# Patient Record
Sex: FEMALE | Race: BLACK OR AFRICAN AMERICAN | NOT HISPANIC OR LATINO | Employment: FULL TIME | URBAN - METROPOLITAN AREA
[De-identification: names, ages, dates, MRNs, and addresses within clinical notes are randomized per-mention and may not be internally consistent; named-entity substitution may affect disease eponyms.]

---

## 2017-01-25 ENCOUNTER — OFFICE VISIT (OUTPATIENT)
Dept: FAMILY MEDICINE | Facility: CLINIC | Age: 27
End: 2017-01-25

## 2017-01-25 VITALS
SYSTOLIC BLOOD PRESSURE: 141 MMHG | BODY MASS INDEX: 47.09 KG/M2 | HEIGHT: 66 IN | TEMPERATURE: 98.4 F | HEART RATE: 84 BPM | WEIGHT: 293 LBS | DIASTOLIC BLOOD PRESSURE: 86 MMHG | OXYGEN SATURATION: 99 %

## 2017-01-25 DIAGNOSIS — F51.01 PRIMARY INSOMNIA: Primary | ICD-10-CM

## 2017-01-25 DIAGNOSIS — F41.9 ANXIETY: ICD-10-CM

## 2017-01-25 RX ORDER — HYDROXYZINE HYDROCHLORIDE 25 MG/1
25 TABLET, FILM COATED ORAL EVERY 6 HOURS PRN
Qty: 90 TABLET | Refills: 3 | Status: SHIPPED | OUTPATIENT
Start: 2017-01-25

## 2017-01-25 NOTE — PROGRESS NOTES
HPI:   June Yepez is a 26 year old  female who presents to clinic today for  Medication refill of her Atarax for which she takes it for anxiety, itching, and sleep as needed.  She states she ran out roughly 1 month ago.  During that time she has realizes the benefit that it gave her and is now requesting more.  She was previously seen roughly one year ago at our clinic by Dr. Hernandez is no longer at her clinic.  She has previously seen a therapist at [facility] and stated it was a good experience, however there no longer there.  Would be willing to see a new therapist at our clinic.  PTH Q9 today was 6 and SOCORRO 7 Was 13.  Denies any previous side effects with the medication.  Denies any suicidal ideation.  Medication list reviewed and updated.  Patient still has Nexplanon in place.  No other concerns today.    Patient is an established patient of this clinic.         PMHX:   Active Problems List  Patient Active Problem List   Diagnosis     Abnormal Pap smear of cervix     Allergic rhinitis     Posttraumatic stress disorder     Major depressive disorder, single episode, moderate (H)     Obesity     Unprotected sexual intercourse     Active problem list reviewed and updated.    Current Medications  Current Outpatient Prescriptions   Medication Sig Dispense Refill     hydrOXYzine (ATARAX) 25 MG tablet Take 1 tablet (25 mg) by mouth every 6 hours as needed for anxiety (Can take 50 mg before bedtime to help you sleep.) 90 tablet 3     etonogestrel (NEXPLANON) 68 MG IMPL 1 each by Subdermal route once Placed 5/2015       acetaminophen (TYLENOL) 500 MG tablet Take 500-1,000 mg by mouth every 6 hours as needed       Medication list reviewed and updated.    Social History  - Active Smoker: No  - Alcohol Use: No  - Illicit Drug use: No    Allergies  Allergies   Allergen Reactions     Penicillins      Face swelling     Egg Yolk      Allergic Eggs, throat will swollen up     Olive Oil      Throat swollen up  "    Allergies and Medication Intolerances Updated         Physical Exam:     Filed Vitals:    01/25/17 1610   BP: 141/86   Pulse: 84   Temp: 98.4  F (36.9  C)   TempSrc: Oral   Height: 5' 6\" (167.6 cm)   Weight: 321 lb 3.2 oz (145.695 kg)   SpO2: 99%     Body mass index is 51.87 kg/(m^2).    General: Obese female. Appears well and in no acute distress.  HEENT: Eyes grossly normal to inspection. Extraocular movements intact. Pupils equal, round, and reactive to light. Mucous membranes moist. No ulcers or lesions noted in the oropharynx.  Cardiovascular: Regular rate and rhythm, normal S1 and S2 without murmur. No extra heartsounds or friction rub. Radial pulses present and equal bilaterally.  Respiratory: Lungs clear to auscultation bilaterally. No wheezing or crackles. No prolonged expiration. Symmetrical chest rise.    Assessment and Plan   Anxiety: No suicidal ideation. Would like to see a therapist again. Referral placed. Atarax prescribed.  - hydrOXYzine (ATARAX) 25 MG tablet; Take 1 tablet (25 mg) by mouth every 6 hours as needed for anxiety (Can take 50 mg before bedtime to help you sleep.)  Dispense: 90 tablet; Refill: 3  - Mental Health Referral - [facility]    Options for treatment and follow-up care were reviewed with the patient and/or guardian. June Yepez and/or guardian engaged in the decision making process and verbalized understanding of the options discussed and agreed with the final plan.    Shai Severino MD  [facility] Resident  Pager# [phone]    Precepted with: Esau Hope MD            "

## 2017-01-25 NOTE — PATIENT INSTRUCTIONS
- I have sent a prescription of Atarax to your pharmacy. Take it every 6 hours as needed (25 mg). You can take 50 mg at night for sleep    - I have put in a referral for a therapist. They will call you to set this up.    As always, please call with any questions or concerns. I look forward to seeing you again soon!    Take care,  Dr. Severino

## 2017-01-25 NOTE — MR AVS SNAPSHOT
After Visit Summary   1/25/2017    June Yepez    MRN: [id number]           Patient Information     Date Of Birth          1990        Visit Information        Provider Department      1/25/2017 4:00 PM Shai Severino MD [facility]        Today's Diagnoses     Insomnia    -  1     Anxiety           Care Instructions    - I have sent a prescription of Atarax to your pharmacy. Take it every 6 hours as needed (25 mg). You can take 50 mg at night for sleep    - I have put in a referral for a therapist. They will call you to set this up.    As always, please call with any questions or concerns. I look forward to seeing you again soon!    Take care,  Dr. Severino          Follow-ups after your visit        Additional Services     Mental Health Referral - [facility]       Use this form for behavioral health consults and assessments. The referral coordinator will help to determine whether patients are best served by clinic behavioral health staff or by community providers.    Type of referral(s) requested (indicate all that apply):  Adult Psychotherapy--for diagnosis and non-pharmacological treatment    Reason for referral: Anxiety    Currently receiving mental health services (if 'Yes', what services and why today's referral?): No, was previously seen at [place]   Currently having suicidal thoughts: No  Previous psych hospitalization: Yes, ~ 7 years ago    Please provide data for below screening tools if available.   PHQ-9 Score: 6  GAD7 Score: 13     needed: No  Language: English                  Who to contact     Please call your clinic at [phone] to:    Ask questions about your health    Make or cancel appointments    Discuss your medicines    Learn about your test results    Speak to your doctor   If you have compliments or concerns about an experience at your clinic, or if you wish to file a complaint, please contact [facility]  "Physicians Patient Relations at [phone] or email us at [email or web address]         Additional Information About Your Visit        [employer] Information     [employer] gives you secure access to your electronic health record. If you see a primary care provider, you can also send messages to your care team and make appointments. If you have questions, please call your primary care clinic.  If you do not have a primary care provider, please call [phone] and they will assist you.      [employer] [employer] is an electronic gateway that provides easy, online access to your medical records. With [employer], you can request a clinic appointment, read your test results, renew a prescription or communicate with your care team.     To access your existing account, please contact your [facility] or call [phone] for assistance.        Care EveryWhere ID     This is your Care EveryWhere ID. This could be used by other organizations to access your [facility] medical records  [id number]        Your Vitals Were     Pulse Temperature Height BMI (Body Mass Index) Pulse Oximetry       84 98.4  F (36.9  C) (Oral) 5' 6\" (167.6 cm) 51.87 kg/m2 99%        Blood Pressure from Last 3 Encounters:   01/25/17 141/86   08/27/15 120/73   06/23/15 115/82    Weight from Last 3 Encounters:   01/25/17 321 lb 3.2 oz (145.695 kg)   08/27/15 298 lb 6.4 oz (135.353 kg)   06/23/15 286 lb 3.2 oz (129.819 kg)              We Performed the Following     Mental Health Referral - [facility]          Where to get your medicines      These medications were sent to [id number] - [place], [place] - [address], [place] [address]     Phone:  [phone]    - hydrOXYzine 25 MG tablet       Primary Care Provider Office Phone # Fax #    Shai Severino [phone]       [facility] [address]   " [place] [address]        Thank you!     Thank you for choosing [facility]  for your care. Our goal is always to provide you with excellent care. Hearing back from our patients is one way we can continue to improve our services. Please take a few minutes to complete the written survey that you may receive in the mail after your visit with us. Thank you!             Your Updated Medication List - Protect others around you: Learn how to safely use, store and throw away your medicines at www.disposemymeds.org.          This list is accurate as of: 1/25/17  4:28 PM.  Always use your most recent med list.                   Brand Name Dispense Instructions for use    acetaminophen 500 MG tablet    TYLENOL     Take 500-1,000 mg by mouth every 6 hours as needed       hydrOXYzine 25 MG tablet    ATARAX    90 tablet    Take 1 tablet (25 mg) by mouth every 6 hours as needed for anxiety (Can take 50 mg before bedtime to help you sleep.)       NEXPLANON 68 MG Impl   Generic drug:  etonogestrel      1 each by Subdermal route once Placed 5/2015

## 2017-01-25 NOTE — PROGRESS NOTES
Preceptor Attestation:  Patient's case reviewed and discussed with Shai Severino MD Patient seen and discussed with the resident.. I agree with assessment and plan of care.  Supervising Physician:  Esau Hpoe MD  [facility]

## 2017-01-30 ENCOUNTER — DOCUMENTATION ONLY (OUTPATIENT)
Dept: FAMILY MEDICINE | Facility: CLINIC | Age: 27
End: 2017-01-30

## 2017-01-30 NOTE — PROGRESS NOTES
Procedure Requested        9035     Mental Health Referral - [facility] [#[id number]]         Priority: Routine  Class: External referral         Comment:Use this form for behavioral health consults and assessments. The                  referral coordinator will help to determine whether patients are                  best served by clinic behavioral health staff or by community                  providers.                                    Type of referral(s) requested (indicate all that apply):                  Adult Psychotherapy--for diagnosis and non-pharmacological                   treatment                                    Reason for referral: Anxiety                                    Currently receiving mental health services (if 'Yes', what                   services and why today's referral?): No, was previously seen at                   [place]                   Currently having suicidal thoughts: No                  Previous psych hospitalization: Yes, ~ 7 years ago                                    Please provide data for below screening tools if available.                   PHQ-9 Score: 6                  GAD7 Score: 13                                     needed: No                  Language: English       Associated Diagnoses         F41.9 Anxiety               YUDELKA GARCIA FLOW          [id number]               : 1990  F      [address]                                PCP: MARICRUZ, DO*     [place] [address]                                CTR: [facility]

## 2017-01-30 NOTE — PROGRESS NOTES
What: Psychology   Where: [facility]    When:    Who is with:    Telephone: [phone]  Fax: [phone]  Any additional comments: I called the pt to help set up this appointment, pt stated that she did not know her job schedule. I told her that she is already been approved to see , she can call and set up this appointment when she knows her schedule.  Pt stated that she will call and make the appointment when she knows her schedule.   Scheduled by: NICK Hurt

## 2017-02-22 ENCOUNTER — OFFICE VISIT (OUTPATIENT)
Dept: FAMILY MEDICINE | Facility: CLINIC | Age: 27
End: 2017-02-22

## 2017-02-22 VITALS
OXYGEN SATURATION: 100 % | RESPIRATION RATE: 16 BRPM | SYSTOLIC BLOOD PRESSURE: 132 MMHG | HEART RATE: 73 BPM | TEMPERATURE: 98.7 F | HEIGHT: 67 IN | DIASTOLIC BLOOD PRESSURE: 84 MMHG | BODY MASS INDEX: 45.99 KG/M2 | WEIGHT: 293 LBS

## 2017-02-22 DIAGNOSIS — A59.9 TRICHOMONAS INFECTION: Primary | ICD-10-CM

## 2017-02-22 DIAGNOSIS — R35.0 URINARY FREQUENCY: ICD-10-CM

## 2017-02-22 LAB
BACTERIA: NORMAL
BACTERIA: NORMAL
BILIRUBIN UR: NEGATIVE
BLOOD UR: ABNORMAL
CASTS: NORMAL /LPF
CLARITY, URINE: CLEAR
CLUE CELLS: NORMAL
COLOR UR: YELLOW
CRYSTAL URINE: NORMAL /LPF
EPITHELIAL CELLS UR: NORMAL /LPF (ref 0–2)
GLUCOSE URINE: NEGATIVE
HCG UR QL: NEGATIVE
KETONES UR QL: NEGATIVE
LEUKOCYTE ESTERASE UR: ABNORMAL
MOTILE TRICHOMONAS: POSITIVE
MUCOUS URINE: NORMAL LPF
NITRITE UR QL STRIP: NEGATIVE
ODOR: NORMAL
PH UR STRIP: 5.5 [PH] (ref 5–7)
PH WET PREP: NORMAL
PROTEIN UR: NEGATIVE
RBC URINE: NORMAL /HPF
SP GR UR STRIP: 1.02
UROBILINOGEN UR STRIP-ACNC: ABNORMAL
WBC URINE: NORMAL /HPF
WBC WET PREP: NORMAL
YEAST: NORMAL

## 2017-02-22 RX ORDER — METRONIDAZOLE 500 MG/1
2000 TABLET ORAL ONCE
Qty: 8 TABLET | Refills: 0 | Status: SHIPPED | OUTPATIENT
Start: 2017-02-22 | End: 2017-02-22

## 2017-02-22 NOTE — PROGRESS NOTES
"[facility] Progress note: February 22, 2017       HPI:       June Yepez is a 27 year old female who presents for new concern of:     Vaginal discharge/urinary frequency:   - on going the past 2 weeks  - having whitish vaginal discharge with foul odor, also having itching   - also describes vaginal discomfort, \"feels like something is stuck\", does not think there is any tampon or foreign body present  - also mentions experiencing urinary frequency/urgency and breast \"tingling\" intermittently over the past 2 weeks and wants to make sure she is not pregnant, is using nexplanon for birth control, LMP 1/30, has had unprotected sex recently with the same partner she has had over the past year  - no hx of frequent UTI's or vaginal infections   - no fever, chills, nausea, or vomiting           PMHX:     Patient Active Problem List   Diagnosis     Abnormal Pap smear of cervix     Allergic rhinitis     Posttraumatic stress disorder     Major depressive disorder, single episode, moderate (H)     Unprotected sexual intercourse     Morbid obesity (H)     Family history of malignant neoplasm of breast in relative diagnosed when younger than 45 years of age       Current Outpatient Prescriptions   Medication Sig Dispense Refill     hydrOXYzine (ATARAX) 25 MG tablet Take 1 tablet (25 mg) by mouth every 6 hours as needed for anxiety (Can take 50 mg before bedtime to help you sleep.) 90 tablet 3     etonogestrel (NEXPLANON) 68 MG IMPL 1 each by Subdermal route once Placed 5/2015       acetaminophen (TYLENOL) 500 MG tablet Take 500-1,000 mg by mouth every 6 hours as needed            Allergies   Allergen Reactions     Penicillins      Face swelling     Egg Yolk      Allergic Eggs, throat will swollen up     Olive Oil      Throat swollen up            Review of Systems:   Complete ROS negative other than above          Physical Exam:     Vitals:    02/22/17 1552   BP: 132/84   Pulse: 73   Resp: 16   Temp: 98.7  F (37.1 " " C)   TempSrc: Oral   SpO2: 100%   Weight: (!) 326 lb (147.9 kg)   Height: 5' 7\" (170.2 cm)     Body mass index is 51.06 kg/(m^2).    Gen: AOx3, NAD  HEENT: PERRL, EOMI, no icterus, MMM  Lungs: CTAB, no wheezing or crackles  CV: RRR, no murmurs/rubs/gallops  ABD: Soft, obese, nontender, ND, no masses, BS+  : normal appearing vaginal mucosa, cervix not visualized, whitish-green thick malodorous discharge present  Extrem: warm with pulses, no edema  Skin: no rash or lesions visible  Neuro: grossly intact, no focal deficits    Results for orders placed or performed in visit on 02/22/17   Urinalysis, Micro If ([facility])   Result Value Ref Range    Specific Gravity Urine 1.020 1.005 - 1.030    pH Urine 5.5 4.5 - 8.0    Leukocyte Esterase UR 2+ (A) NEGATIVE    Nitrite Urine Negative NEGATIVE    Protein UR Negative NEGATIVE    Glucose Urine Negative NEGATIVE    Ketones Urine Negative NEGATIVE    Urobilinogen mg/dL 0.2 E.U./dL 0.2 E.U./dL    Bilirubin UR Negative NEGATIVE    Blood UR 1+ (A) NEGATIVE    Color Urine Yellow     Clarity, urine Clear    HCG Qualitative Urine (UPT)  ([facility])   Result Value Ref Range    HCG Qual Urine NEGATIVE Negative    Narrative    SG 1.020   Urine Microscopic ([facility])   Result Value Ref Range    WBC Urine  <5 /hpf    RBC Urine 5-10 <5 /hpf    Epithelial Cells UR 10-25 0 - 2 /lpf    Mucous Urine None NONE lpf    Casts Urine None NONE /lpf    Crystal Urine None NONE /lpf    Bacteria Wet Prep Few None   Wet Prep ([facility])   Result Value Ref Range    Yeast Wet Prep None none    Motile Trichomonas Wet Prep Positive Negative    Clue Cells Wet Prep None NONE    WBC WET PREP 5-10 2 - 5    Bacteria Wet Prep Moderate None    pH Wet Prep Not performed 3.8 - 4.5    Odor Wet Prep None NONE         Assessment and Plan     Vaginal discharge: started about 2 weeks ago, also associated with vaginal discomfort, urinary frequency. On exam there is thick malodorous whitish-green discharge present. Reports " she has been a stable relationship with same partner for about the last year, no new sexual partners.   - UPT negative  - Wet prep is positive for trichomonas infection - discussed that this is a sexually transmitted disease and her partner will also require treatment. Should use condoms for a week after taking antibiotic. Prescribed 2 g metronidazole one time dose for her and her partner who has no antibiotic allergies. Discussed avoidance of alcohol while taking this. Also provided hand-out on trichomonas infection.   - GC/chlamydia pending    Urinary frequency: UA w/micro shows pyuria. This is likely related to trichomonas urethritis. Treatment as above with metronidazole.     Bong Barreto MD PGY3  [facility] Medicine Residency      Precepted today with: Dr. Villar

## 2017-02-22 NOTE — PATIENT INSTRUCTIONS
Take metronidazole 4 pills once. You're partner should do the same. For the next week use condoms during sex.   Trichomonas Vaginal Infection (Trichomoniasis)    You have a Trichomonas vaginal infection. This problem is often called  trich.  It is caused by a parasite that is passed during sex. This makes trich a sexually transmitted disease (STD). Both men and women can get trich, but it is more common in women.  Most people who have trich don t have any symptoms at first. If symptoms do occur, they may take weeks or months to develop.  Symptoms in women can include:    Thin discharge from the vagina that may smell bad and be clear, white, gray, green, or yellow in color    Itching, burning, redness, or soreness in or around the vagina    Pain in the lower belly    Frequent urination or pain and burning during urination    Pain during sex  Symptoms in men are not very common. Symptoms in men are not very common. Men may have trich and pass it to women during sex without knowing they were ever infected.  Trich is most often trated with anbiotics. Without treatment, trich can increase the risk of more serious health problems such as:    Pelvic inflammatory disease (PID)     delivery (giving birth to a baby early if you re pregnant)    HIV and certain other sexually transmitted diseases (STDs)  Home care    Take the antibiotics you re prescribed exactly as directed. Finish all of the medicine, even if your symptoms go away.    Avoid drinking alcohol until you re done with your treatment.    Tell any partners you have sex with that you have trich. They will need be tested for trich and possibly treated as well.    Avoid having sex until you and any partners you have sex with are confirmed to no longer have trich.  Prevention  The only way to avoid getting trich or any other STD is to avoid having sex. If you choose to have sex, then take steps to lower your health risks:    Use condoms when having sex.    Limit  the number of partners you have sex with.    Get tested regularly for STDs. Ask any partner you have sex with to do the same.    Don t have sex with anyone who has symptoms that may be due to an STD.  Follow-up care  Follow up with your healthcare provider, or as advised. Testing will likely be done to ensure that the infection has cleared.  When to seek medical advice  Call your healthcare provider right away if:    You have a fever of 100.4 F (38 C) or higher, or as directed by your provider.    Your symptoms worsen, or they don t go away even after completing your treatment.    You have new pain in the lower belly or pelvic region.    You have side effects that bother you or a reaction to the medicine you re taking.    You or any partners you have sex with have new symptoms, such as a rash, joint pain, or sores.    2237-4940 [employer]. [address] [place] [address], [place] [address]. All rights reserved. This information is not intended as a substitute for professional medical care. Always follow your healthcare professional's instructions.

## 2017-02-22 NOTE — PROGRESS NOTES
Preceptor Attestation:  Patient's case reviewed and discussed with Bong Barreto MD resident and I evaluated the patient. I agree with written assessment and plan of care.  Supervising Physician:Jhoan Villar MD    [facility]

## 2017-02-22 NOTE — MR AVS SNAPSHOT
After Visit Summary   2017    June Yepez    MRN: [id number]           Patient Information     Date Of Birth          1990        Visit Information        Provider Department      2017 3:40 PM Bong Barreto MD [facility]        Today's Diagnoses     Urinary frequency    -  1    Abdominal pain, generalized        Vaginal discharge        Trichomonas infection          Care Instructions    Take metronidazole 4 pills once. You're partner should do the same. For the next week use condoms during sex.   Trichomonas Vaginal Infection (Trichomoniasis)    You have a Trichomonas vaginal infection. This problem is often called  trich.  It is caused by a parasite that is passed during sex. This makes trich a sexually transmitted disease (STD). Both men and women can get trich, but it is more common in women.  Most people who have trich don t have any symptoms at first. If symptoms do occur, they may take weeks or months to develop.  Symptoms in women can include:    Thin discharge from the vagina that may smell bad and be clear, white, gray, green, or yellow in color    Itching, burning, redness, or soreness in or around the vagina    Pain in the lower belly    Frequent urination or pain and burning during urination    Pain during sex  Symptoms in men are not very common. Symptoms in men are not very common. Men may have trich and pass it to women during sex without knowing they were ever infected.  Trich is most often trated with anbiotics. Without treatment, trich can increase the risk of more serious health problems such as:    Pelvic inflammatory disease (PID)     delivery (giving birth to a baby early if you re pregnant)    HIV and certain other sexually transmitted diseases (STDs)  Home care    Take the antibiotics you re prescribed exactly as directed. Finish all of the medicine, even if your symptoms go away.    Avoid drinking alcohol until you re done with your  treatment.    Tell any partners you have sex with that you have trich. They will need be tested for trich and possibly treated as well.    Avoid having sex until you and any partners you have sex with are confirmed to no longer have trich.  Prevention  The only way to avoid getting trich or any other STD is to avoid having sex. If you choose to have sex, then take steps to lower your health risks:    Use condoms when having sex.    Limit the number of partners you have sex with.    Get tested regularly for STDs. Ask any partner you have sex with to do the same.    Don t have sex with anyone who has symptoms that may be due to an STD.  Follow-up care  Follow up with your healthcare provider, or as advised. Testing will likely be done to ensure that the infection has cleared.  When to seek medical advice  Call your healthcare provider right away if:    You have a fever of 100.4 F (38 C) or higher, or as directed by your provider.    Your symptoms worsen, or they don t go away even after completing your treatment.    You have new pain in the lower belly or pelvic region.    You have side effects that bother you or a reaction to the medicine you re taking.    You or any partners you have sex with have new symptoms, such as a rash, joint pain, or sores.    4781-2454 [employer]. [address] [place] [address], [place] [address]. All rights reserved. This information is not intended as a substitute for professional medical care. Always follow your healthcare professional's instructions.              Follow-ups after your visit        Who to contact     Please call your clinic at [phone] to:    Ask questions about your health    Make or cancel appointments    Discuss your medicines    Learn about your test results    Speak to your doctor   If you have compliments or concerns about an experience at your clinic, or if you wish to file a complaint, please contact [facility] Patient Relations  "at [phone] or email us at [email or web address]         Additional Information About Your Visit        [employer] Information     [employer] gives you secure access to your electronic health record. If you see a primary care provider, you can also send messages to your care team and make appointments. If you have questions, please call your primary care clinic.  If you do not have a primary care provider, please call [phone] and they will assist you.      [employer] is an electronic gateway that provides easy, online access to your medical records. With [employer], you can request a clinic appointment, read your test results, renew a prescription or communicate with your care team.     To access your existing account, please contact your [facility] or call [phone] for assistance.        Care EveryWhere ID     This is your Care EveryWhere ID. This could be used by other organizations to access your [facility] medical records  [id number]        Your Vitals Were     Pulse Temperature Respirations Height Last Period Pulse Oximetry    73 98.7  F (37.1  C) (Oral) 16 5' 7\" (170.2 cm) 01/30/2017 100%    BMI (Body Mass Index)                   51.06 kg/m2            Blood Pressure from Last 3 Encounters:   02/22/17 132/84   01/25/17 141/86   08/27/15 120/73    Weight from Last 3 Encounters:   02/22/17 (!) 326 lb (147.9 kg)   01/25/17 (!) 321 lb 3.2 oz (145.7 kg)   08/27/15 298 lb 6.4 oz (135.4 kg)              We Performed the Following     Chlamydia/Gono Amplified ([facility])     HCG Qualitative Urine (UPT)  ([facility])     Urinalysis, Micro If ([facility])     Urine Microscopic ([facility])     Wet Prep ([facility])          Today's Medication Changes          These changes are accurate as of: 2/22/17  5:04 PM.  If you have any questions, ask your nurse or doctor.               Start taking these medicines.        Dose/Directions    metroNIDAZOLE 500 MG tablet   Commonly known as:  FLAGYL "   Used for:  Trichomonas infection   Started by:  Bong Barreto MD        Dose:  2000 mg   Take 4 tablets (2,000 mg) by mouth once for 1 dose   Quantity:  8 tablet   Refills:  0            Where to get your medicines      These medications were sent to [id number] - [place], [place] - [address] [place] [address] AT [facility]  [address], [place] [address]     Phone:  [phone]     metroNIDAZOLE 500 MG tablet                Primary Care Provider Office Phone # Fax #    Shai Severino [phone]       [facility] [address] [facility] [place] [address]        Thank you!     Thank you for choosing [facility]  for your care. Our goal is always to provide you with excellent care. Hearing back from our patients is one way we can continue to improve our services. Please take a few minutes to complete the written survey that you may receive in the mail after your visit with us. Thank you!             Your Updated Medication List - Protect others around you: Learn how to safely use, store and throw away your medicines at www.disposemymeds.org.          This list is accurate as of: 2/22/17  5:04 PM.  Always use your most recent med list.                   Brand Name Dispense Instructions for use    acetaminophen 500 MG tablet    TYLENOL     Take 500-1,000 mg by mouth every 6 hours as needed       hydrOXYzine 25 MG tablet    ATARAX    90 tablet    Take 1 tablet (25 mg) by mouth every 6 hours as needed for anxiety (Can take 50 mg before bedtime to help you sleep.)       metroNIDAZOLE 500 MG tablet    FLAGYL    8 tablet    Take 4 tablets (2,000 mg) by mouth once for 1 dose       NEXPLANON 68 MG Impl   Generic drug:  etonogestrel      1 each by Subdermal route once Placed 5/2015

## 2017-02-23 LAB
C TRACH RRNA SPEC QL NAA+PROBE: NEGATIVE
N GONORRHOEA RRNA SPEC QL NAA+PROBE: NEGATIVE

## 2017-03-06 ENCOUNTER — OFFICE VISIT (OUTPATIENT)
Dept: FAMILY MEDICINE | Facility: CLINIC | Age: 27
End: 2017-03-06

## 2017-03-06 VITALS
DIASTOLIC BLOOD PRESSURE: 78 MMHG | HEART RATE: 96 BPM | HEIGHT: 67 IN | SYSTOLIC BLOOD PRESSURE: 116 MMHG | TEMPERATURE: 97.6 F | RESPIRATION RATE: 16 BRPM | OXYGEN SATURATION: 96 % | BODY MASS INDEX: 45.99 KG/M2 | WEIGHT: 293 LBS

## 2017-03-06 DIAGNOSIS — R07.0 THROAT PAIN: Primary | ICD-10-CM

## 2017-03-06 LAB — S PYO AG THROAT QL IA.RAPID: NEGATIVE

## 2017-03-06 RX ORDER — IBUPROFEN 400 MG/1
400 TABLET, FILM COATED ORAL EVERY 6 HOURS PRN
Qty: 120 TABLET | Refills: 0 | Status: SHIPPED | OUTPATIENT
Start: 2017-03-06

## 2017-03-06 NOTE — PROGRESS NOTES
HPI:   June Yepez is a 27 year old  female who presents to clinic today for sore throat. Associated left ear pain, dry cough for 3 days. Denies fever, chills, night sweats, chest pain, lymphadenopathy, muscle and joint aches or swelling. Symptoms started with cough and then progressed to sore throat, runny nose, and nose/ left ear pain. Maybe a little more tired than usual. Has tried tylenol and dayquil/nyquil without much relief. Denies being pregnant and has nexplanon in place.  Daughter just got over a cold at home and goes to day care. Mom has the flu. No recent travel. Did not get flu shot this year. Has an allergy to penicillin (facial swelling). Had strep in January and February. Finished all medications (Z pack) at that time.     Patient declines pap smear today.    Patient is an established patient of this clinic.         PMHX:   Active Problems List  Patient Active Problem List   Diagnosis     Abnormal Pap smear of cervix     Allergic rhinitis     Posttraumatic stress disorder     Major depressive disorder, single episode, moderate (H)     Unprotected sexual intercourse     Morbid obesity (H)     Family history of malignant neoplasm of breast in relative diagnosed when younger than 45 years of age     Active problem list reviewed and updated.    Current Medications  Current Outpatient Prescriptions   Medication Sig Dispense Refill     hydrOXYzine (ATARAX) 25 MG tablet Take 1 tablet (25 mg) by mouth every 6 hours as needed for anxiety (Can take 50 mg before bedtime to help you sleep.) 90 tablet 3     etonogestrel (NEXPLANON) 68 MG IMPL 1 each by Subdermal route once Placed 5/2015       acetaminophen (TYLENOL) 500 MG tablet Take 500-1,000 mg by mouth every 6 hours as needed       Medication list reviewed and updated.    Family History  Family History   Problem Relation Age of Onset     DIABETES Mother      type 1 and 2     Hypertension Mother      Breast Cancer Mother 35     Asthma Other       "Other - See Comments Other      brochitis     HEART DISEASE Other      Other - See Comments Other      phlebitis     CEREBROVASCULAR DISEASE Other      Other - See Comments Other      Colitis     DIABETES Other      Thyroid Disease Other      Arthritis Other      Other - See Comments Other      mental health       Family history reviewed and updated.  Social History  - Has 1 daughter  - Active Smoker: No    Allergies  Allergies   Allergen Reactions     Penicillins      Face swelling     Egg Yolk      Allergic Eggs, throat will swollen up     Olive Oil      Throat swollen up     Allergies and Medication Intolerances Updated         Physical Exam:     Vitals:    03/06/17 0947   BP: 116/78   Pulse: 96   Resp: 16   Temp: 97.6  F (36.4  C)   TempSrc: Oral   SpO2: 96%   Weight: (!) 323 lb (146.5 kg)   Height: 5' 7\" (170.2 cm)     Body mass index is 50.59 kg/(m^2).    General: Appears well and in no acute distress.  HEENT: Tonsillar exudate. Eyes grossly normal to inspection. Extraocular movements intact. Pupils equal, round, and reactive to light. Ear canals clear with pearly grey tympanic membranes without bulging or erythema. Mucous membranes moist. No ulcers or lesions or erythema noted in the oropharynx. No sinus pain to palpation.  Cardiovascular: Regular rate and rhythm, normal S1 and S2 without murmur. No extra heartsounds or friction rub. Radial pulses present and equal bilaterally.  Respiratory: Lungs clear to auscultation bilaterally. No wheezing or crackles. No prolonged expiration. Symmetrical chest rise.  GI/Rectal: Soft, non-tender abdomen. No hepatosplenomegaly. Normal active bowel sounds.    Assessment and Plan   Throat pain/Upper respiratory infection: Rapid strep negative, will send for culture. Only has tonsillar exudate on centor criteria. Otherwise sounds like URI with cough, runny nose, left ear pressure. She has never had mono before and dose not have splenomegaly that I can tell on exam. Denies " being pregnant. Will do supportive therapy for now with Ibuprofen and saline nasal rinses. Follow-up PRN.  - Rapid Strep Screen (Group) ([facility])  - Take oral Ibuprofen 400 mg, every 4-6 hours as needed, not to exceed 3,200 per day  - Saline rinse PRN    Preventative Health:  - Declined pap smear today    Options for treatment and follow-up care were reviewed with the patient and/or guardian. June Yepez and/or guardian engaged in the decision making process and verbalized understanding of the options discussed and agreed with the final plan.    Shai Severino MD  [facility] Resident  Pager# [phone]    Precepted with: Ric Bolanos MD      This chart is completed utilizing dictation software; typos and/or incorrect word substitutions may unintentionally occur.

## 2017-03-06 NOTE — PATIENT INSTRUCTIONS
- Your strep test was negative, but we will still send a culture looking for other bacteria and call you with these results  - For pain, Take oral Ibuprofen 400 mg, every 4-6 hours as needed, not to exceed 3,200 per day  - You may also try saline sinus rinses for your congestion symptoms    As always, please call with any questions or concerns. I look forward to seeing you again soon!    Take care,  Dr. Severino      Your current medication list is printed. Please keep this with you - it is helpful to bring this current list to any other medical appointments. It can also be helpful if you ever go to the emergency room or hospital.    If you had lab testing today we will call you with the results. The phone number we will call with your results is # [phone] (home) . If this is not the best number please call our clinic and change the number.    If you need any refills, please call your pharmacy and they will contact us.    If you have any further concerns or wish to schedule another appointment, please call our office at [phone] during normal business hours (8-5, M-F).    If you have urgent medical questions that cannot wait, you may call [phone] at any time of day.    If you have a medical emergency, please call [phone].    Thank you for coming to [facility].      Ibuprofen (ie. Motrin, Advil)  Description    Ibuprofen is a nonsteroidal anti-inflammatory drug or NSAID, generally used to treat minor/moderate pain, menstrual cramping, and to reduce fever.    This medicine is available both over-the-counter (OTC) and with a prescription.  Storage     Store the medicine in a closed container at room temperature, away from heat, moisture, and direct light. Keep from freezing.    Keep out of the reach of children.  Proper Use    Take this medicine only as directed by your doctor.    Do not take this medicine with other NSAID medicines    You should take this medicine with food if possible to  lessen stomach upset.    This medicine should not be taken with alcohol.  Side Effects    Common side effects include itching, swelling, heartburn, nausea, vomiting, bloating, and loss of appetite.    Check with your doctor or seek medical attention immediately if any of the following side effects occur:  1. Continuing ringing or buzzing in the ears  2. Severe pain in abdomen  3. Bloody or black, tarry stools   4. Bloody, cloudy, or sudden decrease in the amount of urine   5. Unusual bleeding or bruising  6. Pinpoint red spots on the skin, blistering skin, hives, or other skin rash  7. Sores, ulcers, or white spots on the lips or in the mouth.  8. Pain, tightness, or pressure sensation in the chest, jaw, or arm  9. Shortness of breath or trouble breathing  10. Difficulty speaking or weakness in the hands, arms, or legs    In case of overdose, consumers are instructed to seek medical help or contact a Poison Control Center right away. (1-316.267.6689).     Other side effects not listed may also occur in some patients. If you experience other  effects, check with your doctor. You may also report these to the FDA at 8-950-PKI-5210.   Other Important Information    This medicine may raise your risk of having a heart attack or stroke.    This medicine may cause bleeding in your stomach and intestines.    Do not keep outdated medicine. Ask your doctor how you should dispose of any medicine you do not use.    The presence or history of other medical problems may affect the use of this medicine. Tell your doctor if you have any other medical problems, especially:  1. Kidney disease  2. Bleeding or blood clot disorders  3. Heart disease or heart failure  4. History of heart attack or coronary bypass surgery  5. History of stomach or intestinal ulcers  6. History of Stroke  7. History of gastric bypass surgery  Allergies   Tell your doctor if you have ever had any unusual or allergic reaction to this medicine, other  medicines, or if you have any other types of allergies (foods, pets, etc).   Breastfeeding   Studies in women suggest that this medication poses minimal risk to the infant when used during breastfeeding.  Pregnancy  This medicine should not be taken during pregnancy.

## 2017-03-06 NOTE — MR AVS SNAPSHOT
After Visit Summary   3/6/2017    June Yepez    MRN: [id number]           Patient Information     Date Of Birth          1990        Visit Information        Provider Department      3/6/2017 9:40 AM Shai Severino MD [facility]        Today's Diagnoses     Throat pain    -  1      Care Instructions    - Your strep test was negative, but we will still send a culture looking for other bacteria and call you with these results  - For pain, Take oral Ibuprofen 400 mg, every 4-6 hours as needed, not to exceed 3,200 per day  - You may also try saline sinus rinses for your congestion symptoms    As always, please call with any questions or concerns. I look forward to seeing you again soon!    Take care,  Dr. Severino      Your current medication list is printed. Please keep this with you - it is helpful to bring this current list to any other medical appointments. It can also be helpful if you ever go to the emergency room or hospital.    If you had lab testing today we will call you with the results. The phone number we will call with your results is # [phone] (home) . If this is not the best number please call our clinic and change the number.    If you need any refills, please call your pharmacy and they will contact us.    If you have any further concerns or wish to schedule another appointment, please call our office at [phone] during normal business hours (8-5, M-F).    If you have urgent medical questions that cannot wait, you may call [phone] at any time of day.    If you have a medical emergency, please call [phone].    Thank you for coming to [facility].      Ibuprofen (ie. Motrin, Advil)  Description    Ibuprofen is a nonsteroidal anti-inflammatory drug or NSAID, generally used to treat minor/moderate pain, menstrual cramping, and to reduce fever.    This medicine is available both over-the-counter (OTC) and with a prescription.  Storage      Store the medicine in a closed container at room temperature, away from heat, moisture, and direct light. Keep from freezing.    Keep out of the reach of children.  Proper Use    Take this medicine only as directed by your doctor.    Do not take this medicine with other NSAID medicines    You should take this medicine with food if possible to lessen stomach upset.    This medicine should not be taken with alcohol.  Side Effects    Common side effects include itching, swelling, heartburn, nausea, vomiting, bloating, and loss of appetite.    Check with your doctor or seek medical attention immediately if any of the following side effects occur:  1. Continuing ringing or buzzing in the ears  2. Severe pain in abdomen  3. Bloody or black, tarry stools   4. Bloody, cloudy, or sudden decrease in the amount of urine   5. Unusual bleeding or bruising  6. Pinpoint red spots on the skin, blistering skin, hives, or other skin rash  7. Sores, ulcers, or white spots on the lips or in the mouth.  8. Pain, tightness, or pressure sensation in the chest, jaw, or arm  9. Shortness of breath or trouble breathing  10. Difficulty speaking or weakness in the hands, arms, or legs    In case of overdose, consumers are instructed to seek medical help or contact a Poison Control Center right away. (1-347.261.4106).     Other side effects not listed may also occur in some patients. If you experience other  effects, check with your doctor. You may also report these to the FDA at 1-259-FDA-2902.   Other Important Information    This medicine may raise your risk of having a heart attack or stroke.    This medicine may cause bleeding in your stomach and intestines.    Do not keep outdated medicine. Ask your doctor how you should dispose of any medicine you do not use.    The presence or history of other medical problems may affect the use of this medicine. Tell your doctor if you have any other medical problems, especially:  1. Kidney  disease  2. Bleeding or blood clot disorders  3. Heart disease or heart failure  4. History of heart attack or coronary bypass surgery  5. History of stomach or intestinal ulcers  6. History of Stroke  7. History of gastric bypass surgery  Allergies   Tell your doctor if you have ever had any unusual or allergic reaction to this medicine, other medicines, or if you have any other types of allergies (foods, pets, etc).   Breastfeeding   Studies in women suggest that this medication poses minimal risk to the infant when used during breastfeeding.  Pregnancy  This medicine should not be taken during pregnancy.          Follow-ups after your visit        Who to contact     Please call your clinic at [phone] to:    Ask questions about your health    Make or cancel appointments    Discuss your medicines    Learn about your test results    Speak to your doctor   If you have compliments or concerns about an experience at your clinic, or if you wish to file a complaint, please contact [facility] Patient Relations at [phone] or email us at [email or web address]         Additional Information About Your Visit        [employer] Information     [employer] gives you secure access to your electronic health record. If you see a primary care provider, you can also send messages to your care team and make appointments. If you have questions, please call your primary care clinic.  If you do not have a primary care provider, please call [phone] and they will assist you.      [employer] is an electronic gateway that provides easy, online access to your medical records. With [employer], you can request a clinic appointment, read your test results, renew a prescription or communicate with your care team.     To access your existing account, please contact your [facility] or call [phone] for assistance.        Care EveryWhere ID     This is your Care EveryWhere ID.  "This could be used by other organizations to access your [facility] medical records  [id number]        Your Vitals Were     Pulse Temperature Respirations Height Last Period Pulse Oximetry    96 97.6  F (36.4  C) (Oral) 16 5' 7\" (170.2 cm) 01/30/2017 96%    BMI (Body Mass Index)                   50.59 kg/m2            Blood Pressure from Last 3 Encounters:   03/06/17 116/78   02/22/17 132/84   01/25/17 141/86    Weight from Last 3 Encounters:   03/06/17 (!) 323 lb (146.5 kg)   02/22/17 (!) 326 lb (147.9 kg)   01/25/17 (!) 321 lb 3.2 oz (145.7 kg)              We Performed the Following     Rapid Strep Screen (Group) ([facility])          Today's Medication Changes          These changes are accurate as of: 3/6/17 10:19 AM.  If you have any questions, ask your nurse or doctor.               Start taking these medicines.        Dose/Directions    ibuprofen 400 MG tablet   Commonly known as:  ADVIL/MOTRIN   Used for:  Throat pain   Started by:  Shai Severino MD        Dose:  400 mg   Take 1 tablet (400 mg) by mouth every 6 hours as needed for moderate pain or fever   Quantity:  120 tablet   Refills:  0       Sodium Chloride-Sodium Bicarb 1.57 G Pack   Commonly known as:  AYR SALINE NASAL NETI RINSE   Used for:  Throat pain   Started by:  Shai Severino MD        Dose:  1 Application   Spray 1 Application in nostril 2 times daily as needed   Quantity:  40 each   Refills:  0            Where to get your medicines      These medications were sent to [id number] - [place], [place] - [address] AT [facility] & [address], [place] [address]     Phone:  [phone]     ibuprofen 400 MG tablet    Sodium Chloride-Sodium Bicarb 1.57 G Pack                Primary Care Provider Office Phone # Fax #    Shai Severino [phone]       [facility] [address] [place] [address]        Thank you!     Thank you for " choosing [facility]  for your care. Our goal is always to provide you with excellent care. Hearing back from our patients is one way we can continue to improve our services. Please take a few minutes to complete the written survey that you may receive in the mail after your visit with us. Thank you!             Your Updated Medication List - Protect others around you: Learn how to safely use, store and throw away your medicines at www.disposemymeds.org.          This list is accurate as of: 3/6/17 10:19 AM.  Always use your most recent med list.                   Brand Name Dispense Instructions for use    acetaminophen 500 MG tablet    TYLENOL     Take 500-1,000 mg by mouth every 6 hours as needed       hydrOXYzine 25 MG tablet    ATARAX    90 tablet    Take 1 tablet (25 mg) by mouth every 6 hours as needed for anxiety (Can take 50 mg before bedtime to help you sleep.)       ibuprofen 400 MG tablet    ADVIL/MOTRIN    120 tablet    Take 1 tablet (400 mg) by mouth every 6 hours as needed for moderate pain or fever       NEXPLANON 68 MG Impl   Generic drug:  etonogestrel      1 each by Subdermal route once Placed 5/2015       Sodium Chloride-Sodium Bicarb 1.57 G Pack    AYR SALINE NASAL NETI RINSE    40 each    Spray 1 Application in nostril 2 times daily as needed

## 2017-03-06 NOTE — PROGRESS NOTES
Preceptor Attestation:  Patient's case reviewed and discussed with Shai Severino MD Patient seen and discussed with the resident.. I agree with assessment and plan of care.  Supervising Physician:  Ric Bolanos MD  [facility]

## 2017-03-08 LAB — CULTURE: NORMAL

## 2017-03-09 ENCOUNTER — OFFICE VISIT (OUTPATIENT)
Dept: FAMILY MEDICINE | Facility: CLINIC | Age: 27
End: 2017-03-09

## 2017-03-09 VITALS
WEIGHT: 293 LBS | RESPIRATION RATE: 20 BRPM | BODY MASS INDEX: 45.99 KG/M2 | DIASTOLIC BLOOD PRESSURE: 80 MMHG | SYSTOLIC BLOOD PRESSURE: 135 MMHG | OXYGEN SATURATION: 99 % | HEIGHT: 67 IN | TEMPERATURE: 97.8 F | HEART RATE: 89 BPM

## 2017-03-09 DIAGNOSIS — J03.90 EXUDATIVE TONSILLITIS: Primary | ICD-10-CM

## 2017-03-09 LAB — S PYO AG THROAT QL IA.RAPID: NEGATIVE

## 2017-03-09 NOTE — PROGRESS NOTES
Preceptor Attestation:  Patient's case reviewed and discussed with Jerry Awad MD resident and I evaluated the patient. I agree with written assessment and plan of care.  Supervising Physician:Jhoan Villar MD    [facility]

## 2017-03-09 NOTE — PROGRESS NOTES
HPI:   June Yepez is a 27 year old female with PMH of obesity, MDD who presents with:    Sore throat: symptoms ongoing for a week. She also complains of mild cough with green sputum and nasal discharge that started about one day ago. She has a hard time swallowing due to pain. No fevers, chills, diaphoresis, myalgias, arthralgias, skin changes, N/V, diarrhea. She has a two year old in , but no significant sick contacts.     She was seen at [facility] ED for pharyngitis on 1/28/2017 and had positive strep. She has a PCN allergy and was treated with Azithromcyin. She was also given Norco for pain. She reported temporary improvement.     She is travelling to [place] next week for a vacation.          Review of Systems:   10 point ROS negative except as mentioned above              PMHX:     Patient Active Problem List   Diagnosis     Abnormal Pap smear of cervix     Allergic rhinitis     Posttraumatic stress disorder     Major depressive disorder, single episode, moderate (H)     Unprotected sexual intercourse     Morbid obesity (H)     Family history of malignant neoplasm of breast in relative diagnosed when younger than 45 years of age       Current Outpatient Prescriptions   Medication Sig Dispense Refill     ibuprofen (ADVIL/MOTRIN) 400 MG tablet Take 1 tablet (400 mg) by mouth every 6 hours as needed for moderate pain or fever 120 tablet 0     Sodium Chloride-Sodium Bicarb (AYR SALINE NASAL NETI RINSE) 1.57 G PACK Spray 1 Application in nostril 2 times daily as needed 40 each 0     hydrOXYzine (ATARAX) 25 MG tablet Take 1 tablet (25 mg) by mouth every 6 hours as needed for anxiety (Can take 50 mg before bedtime to help you sleep.) 90 tablet 3     etonogestrel (NEXPLANON) 68 MG IMPL 1 each by Subdermal route once Placed 5/2015       acetaminophen (TYLENOL) 500 MG tablet Take 500-1,000 mg by mouth every 6 hours as needed         Allergies   Allergen Reactions     Penicillins      Face swelling  "    Egg Yolk      Allergic Eggs, throat will swollen up     Olive Oil      Throat swollen up          Physical Exam:     Vitals:    03/09/17 1010   BP: 135/80   Pulse: 89   Resp: 20   Temp: 97.8  F (36.6  C)   SpO2: 99%   Weight: (!) 326 lb 3.2 oz (148 kg)   Height: 5' 7\" (170.2 cm)     Body mass index is 51.09 kg/(m^2).  VS reviewed    GENERAL APPEARANCE: obese, pleasant, nontoxic, alert and no distress,  EYES: Eyes grossly normal to inspection,  PERRL  HENT: ear canals and TM's normal and nose normal. Oropharynx with 2+ tonsillar hypertrophy with whitish exudates bilaterally  NECK: no adenopathy, no asymmetry  RESP: lungs clear to auscultation - no rales, rhonchi or wheezes  CV: regular rate and rhythm,  and no murmur, click,  rub or gallop  ABDOMEN: soft, nontender, obese  MS: extremities normal- no gross deformities noted  SKIN: no suspicious lesions or rashes  NEURO: Normal strength and tone, sensory exam grossly normal, mentation appears intact and speech normal  PSYCH: mood and affect normal/bright    Assessment and Plan     1. Exudative tonsillitis: Likely viral process. Rapid strep negative today and three days. Symptoms ongoing for a week. She was seen here three days ago and is getting some improvement with Ibuprofen and Tylenol. I recommended that she increase the dose of Ibuprofen to 800 mg TID. We also recommended salt water gargles, mouthwash, and plenty of rest/fluids.   - Rapid Strep Screen (Group) ([facility])      Options for treatment and follow-up care were reviewed with the patient and/or guardian. June Yepez and/or guardian engaged in the decision making process and verbalized understanding of the options discussed and agreed with the final plan.    Santiago Awad MD  [facility] Resident  Pager # [phone]    Precepted with: Dr. Villar.       "

## 2017-03-09 NOTE — MR AVS SNAPSHOT
After Visit Summary   3/9/2017    June Yepez    MRN: [id number]           Patient Information     Date Of Birth          1990        Visit Information        Provider Department      3/9/2017 10:00 AM Jerry Awad MD [facility]        Today's Diagnoses     Exudative tonsillitis    -  1       Follow-ups after your visit        Follow-up notes from your care team     Return if symptoms worsen or fail to improve.      Who to contact     Please call your clinic at [phone] to:    Ask questions about your health    Make or cancel appointments    Discuss your medicines    Learn about your test results    Speak to your doctor   If you have compliments or concerns about an experience at your clinic, or if you wish to file a complaint, please contact [facility] Patient Relations at [phone] or email us at [email or web address]         Additional Information About Your Visit        MyChart Information     [employer] gives you secure access to your electronic health record. If you see a primary care provider, you can also send messages to your care team and make appointments. If you have questions, please call your primary care clinic.  If you do not have a primary care provider, please call [phone] and they will assist you.      [employer] is an electronic gateway that provides easy, online access to your medical records. With [employer], you can request a clinic appointment, read your test results, renew a prescription or communicate with your care team.     To access your existing account, please contact your [facility] or call [phone] for assistance.        Care EveryWhere ID     This is your Care EveryWhere ID. This could be used by other organizations to access your [facility] medical records  [id number]        Your Vitals Were     Pulse Temperature Respirations Height Last Period Pulse Oximetry    89  "97.8  F (36.6  C) 20 5' 7\" (170.2 cm) 01/30/2017 99%    BMI (Body Mass Index)                   51.09 kg/m2            Blood Pressure from Last 3 Encounters:   03/09/17 135/80   03/06/17 116/78   02/22/17 132/84    Weight from Last 3 Encounters:   03/09/17 (!) 326 lb 3.2 oz (148 kg)   03/06/17 (!) 323 lb (146.5 kg)   02/22/17 (!) 326 lb (147.9 kg)              We Performed the Following     Culture Throat ([facility])     Rapid Strep Screen (Group) ([facility])        Primary Care Provider Office Phone # Fax #    hSai Severino [phone]       [facility] [address] [place] [address]        Thank you!     Thank you for choosing [facility]  for your care. Our goal is always to provide you with excellent care. Hearing back from our patients is one way we can continue to improve our services. Please take a few minutes to complete the written survey that you may receive in the mail after your visit with us. Thank you!             Your Updated Medication List - Protect others around you: Learn how to safely use, store and throw away your medicines at www.disposemymeds.org.          This list is accurate as of: 3/9/17 11:41 AM.  Always use your most recent med list.                   Brand Name Dispense Instructions for use    acetaminophen 500 MG tablet    TYLENOL     Take 500-1,000 mg by mouth every 6 hours as needed       hydrOXYzine 25 MG tablet    ATARAX    90 tablet    Take 1 tablet (25 mg) by mouth every 6 hours as needed for anxiety (Can take 50 mg before bedtime to help you sleep.)       ibuprofen 400 MG tablet    ADVIL/MOTRIN    120 tablet    Take 1 tablet (400 mg) by mouth every 6 hours as needed for moderate pain or fever       NEXPLANON 68 MG Impl   Generic drug:  etonogestrel      1 each by Subdermal route once Placed 5/2015       Sodium Chloride-Sodium Bicarb 1.57 G Pack    AYR SALINE NASAL NETI RINSE    40 each    Spray 1 Application in nostril 2 times " daily as needed

## 2017-03-11 LAB — CULTURE: NORMAL

## 2017-03-28 ENCOUNTER — OFFICE VISIT (OUTPATIENT)
Dept: FAMILY MEDICINE | Facility: CLINIC | Age: 27
End: 2017-03-28

## 2017-03-28 ENCOUNTER — RECORDS - HEALTHEAST (OUTPATIENT)
Dept: ADMINISTRATIVE | Facility: OTHER | Age: 27
End: 2017-03-28

## 2017-03-28 VITALS
BODY MASS INDEX: 44.41 KG/M2 | TEMPERATURE: 98.2 F | WEIGHT: 293 LBS | OXYGEN SATURATION: 100 % | HEIGHT: 68 IN | RESPIRATION RATE: 16 BRPM | SYSTOLIC BLOOD PRESSURE: 131 MMHG | HEART RATE: 66 BPM | DIASTOLIC BLOOD PRESSURE: 82 MMHG

## 2017-03-28 DIAGNOSIS — Z23 FLU VACCINE NEED: ICD-10-CM

## 2017-03-28 DIAGNOSIS — Z12.31 ENCOUNTER FOR SCREENING MAMMOGRAM FOR BREAST CANCER: ICD-10-CM

## 2017-03-28 DIAGNOSIS — Z00.00 ROUTINE GENERAL MEDICAL EXAMINATION AT A HEALTH CARE FACILITY: ICD-10-CM

## 2017-03-28 DIAGNOSIS — Z11.3 SCREEN FOR STD (SEXUALLY TRANSMITTED DISEASE): Primary | ICD-10-CM

## 2017-03-28 LAB
BACTERIA: NORMAL
CLUE CELLS: NORMAL
HIV 1+2 AB+HIV1 P24 AG SERPL QL IA: NEGATIVE
MOTILE TRICHOMONAS: NEGATIVE
ODOR: NORMAL
PH WET PREP: NORMAL
WBC WET PREP: <2
YEAST: NORMAL

## 2017-03-28 NOTE — PROGRESS NOTES
HPI:       June Yepez is a 27 year old  female who presents to address the following concerns:    STD Check:  -had trichomonas about a month ago  -partner didn't get treated for trichomonas  -feels safe with partner, together   -last hep B was ab +, ag neg 2013.  hiv neg 2013, syphillis neg 2013  -new partner since 2013    Due for pap:  -had a high grade lesion many years ago but lately has been OK.  Last pap was 2013    Mom had breast cancer at 28-29:  -went through chemotherapy and lumpectomy.    -mom is still alive now, no recurrence  -patient has had a mammogram at the age of 24 , has not repeated             PMHX:     Patient Active Problem List   Diagnosis     Abnormal Pap smear of cervix     Allergic rhinitis     Posttraumatic stress disorder     Major depressive disorder, single episode, moderate (H)     Unprotected sexual intercourse     Morbid obesity (H)     Family history of malignant neoplasm of breast in relative diagnosed when younger than 45 years of age       Current Outpatient Prescriptions   Medication Sig Dispense Refill     ibuprofen (ADVIL/MOTRIN) 400 MG tablet Take 1 tablet (400 mg) by mouth every 6 hours as needed for moderate pain or fever 120 tablet 0     hydrOXYzine (ATARAX) 25 MG tablet Take 1 tablet (25 mg) by mouth every 6 hours as needed for anxiety (Can take 50 mg before bedtime to help you sleep.) 90 tablet 3     etonogestrel (NEXPLANON) 68 MG IMPL 1 each by Subdermal route once Placed 5/2015       Sodium Chloride-Sodium Bicarb (AYR SALINE NASAL NETI RINSE) 1.57 G PACK Spray 1 Application in nostril 2 times daily as needed (Patient not taking: Reported on 3/28/2017) 40 each 0     acetaminophen (TYLENOL) 500 MG tablet Take 500-1,000 mg by mouth every 6 hours as needed Reported on 3/28/2017            Allergies   Allergen Reactions     Penicillins      Face swelling     Egg Yolk      Allergic Eggs, throat will swollen up     Olive Oil      Throat swollen up       No  "results found for this or any previous visit (from the past 24 hour(s)).    Current Outpatient Prescriptions   Medication     ibuprofen (ADVIL/MOTRIN) 400 MG tablet     hydrOXYzine (ATARAX) 25 MG tablet     etonogestrel (NEXPLANON) 68 MG IMPL     Sodium Chloride-Sodium Bicarb (AYR SALINE NASAL NETI RINSE) 1.57 G PACK     acetaminophen (TYLENOL) 500 MG tablet     No current facility-administered medications for this visit.               Review of Systems:   ROS as described above.  Denies F/S/C/N/V/SOB/CP          Physical Exam:     Vitals:    03/28/17 0858   BP: 131/82   BP Location: Right arm   Patient Position: Other (Comments)   Cuff Size: Adult Large   Pulse: 66   Resp: 16   Temp: 98.2  F (36.8  C)   TempSrc: Oral   SpO2: 100%   Weight: (!) 327 lb (148.3 kg)   Height: 5' 7.91\" (172.5 cm)     Body mass index is 49.85 kg/(m^2).    GEN: patient sitting comfortably in NAD  HEEN: Head is atraumatic, normocephalic, eyes anicteric, mucous membranes moist  CV: RRR w/o M/R/G  PULM: CTAB without w/r/r  ABD: soft, nontender, bowel sounds present  NEURO: Alert and oriented x3.  No focal motor abnormalities.  Face symmetric.  PSYCH: appropriate  SKIN: No rashes, bruising, or other lesions.  Normal breast exam  : normal vaginal exam.  Samples collected    Results for orders placed or performed in visit on 03/28/17   Wet Prep ([facility])   Result Value Ref Range    Yeast Wet Prep None none    Motile Trichomonas Wet Prep Negative Negative    Clue Cells Wet Prep None NONE    WBC WET PREP <2 2 - 5    Bacteria Wet Prep Few None    pH Wet Prep Not performed 3.8 - 4.5    Odor Wet Prep None NONE   Chlamydia/Gono Amplified ([facility])   Result Value Ref Range    Chlamydia trac,Amplified Prb Negative Negative    N gonorrhoeae,Amplified Prb Negative Negative    Narrative    Test performed by:  [facility] [address] [place] [address]   [facility]Ab Screen [place] [facility]   Result Value Ref Range    HIV " Antigen/Antibody Negative Negative    Narrative    Test performed by:  [facility]  [address] [place] [address]   Syphilis Screen [place] ([facility])   Result Value Ref Range    Syphilis Screen [place] Non-Reactive Non-Reactive    Narrative    Test performed by:  [facility] [address] [place] [address]   GYN Cytology ([facility])   Result Value Ref Range    Lab AP Case Report SEE RESULTS BELOW     Lab AP Gyn Interpretation SEE RESULTS BELOW     Lab AP Malignant? Normal Normal    Specimen adequacy:       Satisfactory for evaluation, endocervical/transformation zone component present    HPV Reflex? Yes if Abnormal     High Risk? No     Last Mens Period UNKNOWN     Lab AP Abnormal Bleeding No     Lab AP Patient Status not applicable     Lab AP Birth Control/Hormones None     Lab AP Previous Normal 2013     Lab AP Previous Abnl UNKNOWN     Lab AP Cervical Appearance normal     Narrative    Test performed by:  [facility] [address] [place] [address]         Assessment and Plan     1. Screen for STD (sexually transmitted disease)  - Wet Prep ([facility])  - Chlamydia/Gono Amplified ([facility])  - HIV Ag/Ab Screen [place] ([facility])  - Syphilis Screen [place] ([facility])    2. Family history breast cancer: mother with breast cancer in her 20s.  Patient believes that she was treated with chemo and lumpectomy.  No recurrence.  Per guidelines should have Mammograms annually.  Patient in agreement.   - MA SCREENING DIGITAL BILAT    3. Flu vaccine need  - ADMIN VACCINE, INITIAL  - Flu vaccine, quad, preserve-free, 0.5 ml    4. Routine general medical examination at a health care facility  - GYN Cytology ([facility])    5. Morbid obesity:  Not discussed at todays visit.        Options for treatment and follow-up care were reviewed with the patient and/or guardian. June Yepez and/or guardian engaged in the decision making process and verbalized  understanding of the options discussed and agreed with the final plan.    Mary Ann Segura MD

## 2017-03-28 NOTE — PATIENT INSTRUCTIONS
- You are not due for a Nexplanon placement until 5/2017.  - We will also help you set up appt for a Mammogram.    Your medication list is printed, please keep this with you, it is helpful to bring this current list to any other medical appointments, the emergency room or hospital.    If you had lab testing today and your results are reassuring or normal they will be be mailed to you within 7 days.     If the lab tests need quick action we will call you with the results.   The phone number we will call with results is # [phone] (home) . If this is not the best number please call our clinic and change the number.    If you need any refills please call your pharmacy and they will contact us.    If you have any further concerns or wish to schedule another appointment you must call our office during normal business hours  [phone] (8-5:00 M-F)  If you have urgent medical questions that cannot wait  you may also call [phone] at any time of day.  If you have a medical emergency please call [phone].    Thank you for coming to [facility].      Referral for ( TEST )  :      Mammogram  LOCATION/PLACE/Provider :    [address] [address]  DATE & TIME :     4-5-17 at 9:00am  PHONE :     [phone]  FAX :     [phone]  ADDITIONAL INFORMATION :     Na  Appointment made by clinic staff/:    Whitney

## 2017-03-28 NOTE — NURSING NOTE
June Yepez      1.  Has the patient received the information for the influenza vaccine? YES    2.  Does the patient have any of the following contraindications?     Allergy to eggs? Yes      Allergic reaction to previous influenza vaccines? No     Any other problems to previous influenza vaccines? No     Paralyzed by Guillain-Orcas syndrome? No     Currently pregnant? NO     Current moderate or severe illness? No    Vaccination given by HUGO COCHRAN.  Recorded by CARISSA GARNER

## 2017-03-28 NOTE — MR AVS SNAPSHOT
After Visit Summary   3/28/2017    June Yepez    MRN: [id number]           Patient Information     Date Of Birth          1990        Visit Information        Provider Department      3/28/2017 9:00 AM Mary Ann Segura MD [facility]        Today's Diagnoses     Screen for STD (sexually transmitted disease)    -  1    Encounter for screening mammogram for breast cancer          Care Instructions    - You are not due for a Nexplanon placement until 5/2017.  - We will also help you set up appt for a Mammogram.    Your medication list is printed, please keep this with you, it is helpful to bring this current list to any other medical appointments, the emergency room or hospital.    If you had lab testing today and your results are reassuring or normal they will be be mailed to you within 7 days.     If the lab tests need quick action we will call you with the results.   The phone number we will call with results is # [phone] (home) . If this is not the best number please call our clinic and change the number.    If you need any refills please call your pharmacy and they will contact us.    If you have any further concerns or wish to schedule another appointment you must call our office during normal business hours  [phone] (8-5:00 M-F)  If you have urgent medical questions that cannot wait  you may also call [phone] at any time of day.  If you have a medical emergency please call [phone].    Thank you for coming to [facility].          Follow-ups after your visit        Who to contact     Please call your clinic at [phone] to:    Ask questions about your health    Make or cancel appointments    Discuss your medicines    Learn about your test results    Speak to your doctor   If you have compliments or concerns about an experience at your clinic, or if you wish to file a complaint, please contact [facility] Patient Relations at  [phone] or email us at [email or web address]         Additional Information About Your Visit        [employer] Information     [employer] gives you secure access to your electronic health record. If you see a primary care provider, you can also send messages to your care team and make appointments. If you have questions, please call your primary care clinic.  If you do not have a primary care provider, please call [phone] and they will assist you.      [employer] is an electronic gateway that provides easy, online access to your medical records. With [employer], you can request a clinic appointment, read your test results, renew a prescription or communicate with your care team.     To access your existing account, please contact your [facility] or call [phone] for assistance.        Care EveryWhere ID     This is your Care EveryWhere ID. This could be used by other organizations to access your [place] medical records  [id number]        Your Vitals Were     Pulse Temperature Respirations Height Last Period Pulse Oximetry    66 98.2  F (36.8  C) (Oral) 16 5' 7.91\" (172.5 cm) 01/29/2017 (Exact Date) 100%    BMI (Body Mass Index)                   49.85 kg/m2            Blood Pressure from Last 3 Encounters:   03/28/17 131/82   03/09/17 135/80   03/06/17 116/78    Weight from Last 3 Encounters:   03/28/17 (!) 327 lb (148.3 kg)   03/09/17 (!) 326 lb 3.2 oz (148 kg)   03/06/17 (!) 323 lb (146.5 kg)              We Performed the Following     [facility]     HIV Ag/Ab Screen [place] ([facility])     MA SCREENING DIGITAL BILAT     Syphilis Screen [place] ([facility])     Wet Prep ([facility])        Primary Care Provider Office Phone # Fax #    Shai Severino [phone]       [facility] [address] [place] [address]        Thank you!     Thank you for choosing [facility]  for your care. Our goal is " always to provide you with excellent care. Hearing back from our patients is one way we can continue to improve our services. Please take a few minutes to complete the written survey that you may receive in the mail after your visit with us. Thank you!             Your Updated Medication List - Protect others around you: Learn how to safely use, store and throw away your medicines at www.disposemymeds.org.          This list is accurate as of: 3/28/17  9:37 AM.  Always use your most recent med list.                   Brand Name Dispense Instructions for use    acetaminophen 500 MG tablet    TYLENOL     Take 500-1,000 mg by mouth every 6 hours as needed Reported on 3/28/2017       hydrOXYzine 25 MG tablet    ATARAX    90 tablet    Take 1 tablet (25 mg) by mouth every 6 hours as needed for anxiety (Can take 50 mg before bedtime to help you sleep.)       ibuprofen 400 MG tablet    ADVIL/MOTRIN    120 tablet    Take 1 tablet (400 mg) by mouth every 6 hours as needed for moderate pain or fever       NEXPLANON 68 MG Impl   Generic drug:  etonogestrel      1 each by Subdermal route once Placed 5/2015       Sodium Chloride-Sodium Bicarb 1.57 G Pack    AYR SALINE NASAL NETI RINSE    40 each    Spray 1 Application in nostril 2 times daily as needed

## 2017-03-29 LAB
C TRACH RRNA CVX QL NAA+PROBE: NEGATIVE
N GONORRHOEA RRNA SPEC QL NAA+PROBE: NEGATIVE
RPR SER QL: NORMAL

## 2017-03-29 ASSESSMENT — PATIENT HEALTH QUESTIONNAIRE - PHQ9: SUM OF ALL RESPONSES TO PHQ QUESTIONS 1-9: 0

## 2017-04-04 ENCOUNTER — RECORDS - HEALTHEAST (OUTPATIENT)
Dept: ADMINISTRATIVE | Facility: OTHER | Age: 27
End: 2017-04-04

## 2017-04-04 LAB
HIGH RISK?: NO
HPV REFLEX?: NORMAL
LAB AP ABNORMAL BLEEDING: NO
LAB AP BIRTH CONTROL/HORMONES: NORMAL
LAB AP CASE REPORT: NORMAL
LAB AP CERVICAL APPEARANCE: NORMAL
LAB AP GYN INTERPRETATION: NORMAL
LAB AP MALIGNANT?: NORMAL
LAB AP PATIENT STATUS: NORMAL
LAB AP PREVIOUS ABNL: NORMAL
LAB AP PREVIOUS NORMAL: 2013
LAST MENS PERIOD: NORMAL
SPECIMEN ADEQUACY:: NORMAL

## 2017-05-17 ENCOUNTER — OFFICE VISIT (OUTPATIENT)
Dept: FAMILY MEDICINE | Facility: CLINIC | Age: 27
End: 2017-05-17

## 2017-05-17 VITALS
BODY MASS INDEX: 50.91 KG/M2 | WEIGHT: 293 LBS | OXYGEN SATURATION: 99 % | HEART RATE: 72 BPM | SYSTOLIC BLOOD PRESSURE: 132 MMHG | RESPIRATION RATE: 18 BRPM | TEMPERATURE: 98 F | DIASTOLIC BLOOD PRESSURE: 82 MMHG

## 2017-05-17 DIAGNOSIS — R07.0 THROAT PAIN: Primary | ICD-10-CM

## 2017-05-17 NOTE — MR AVS SNAPSHOT
After Visit Summary   5/17/2017    June Yepez    MRN: [id number]           Patient Information     Date Of Birth          1990        Visit Information        Provider Department      5/17/2017 8:20 AM Smiley Craft MD [facility]        Today's Diagnoses     Throat pain    -  1       Follow-ups after your visit        Who to contact     Please call your clinic at [phone] to:    Ask questions about your health    Make or cancel appointments    Discuss your medicines    Learn about your test results    Speak to your doctor   If you have compliments or concerns about an experience at your clinic, or if you wish to file a complaint, please contact [facility] Patient Relations at [phone] or email us at [email or web address]         Additional Information About Your Visit        MyChart Information     [employer] gives you secure access to your electronic health record. If you see a primary care provider, you can also send messages to your care team and make appointments. If you have questions, please call your primary care clinic.  If you do not have a primary care provider, please call [phone] and they will assist you.      [employer] is an electronic gateway that provides easy, online access to your medical records. With [employer], you can request a clinic appointment, read your test results, renew a prescription or communicate with your care team.     To access your existing account, please contact your [facility] or call [phone] for assistance.        Care EveryWhere ID     This is your Care EveryWhere ID. This could be used by other organizations to access your [place] medical records  [id number]        Your Vitals Were     Pulse Temperature Respirations Pulse Oximetry BMI (Body Mass Index)       72 98  F (36.7  C) (Oral) 18 99% 50.91 kg/m2        Blood Pressure from Last 3 Encounters:    05/17/17 132/82   03/28/17 131/82   03/09/17 135/80    Weight from Last 3 Encounters:   05/17/17 (!) 334 lb (151.5 kg)   03/28/17 (!) 327 lb (148.3 kg)   03/09/17 (!) 326 lb 3.2 oz (148 kg)              Today, you had the following     No orders found for display       Primary Care Provider Office Phone # Fax #    Shai Severino [phone]       [facility] [address] [place] [address]        Thank you!     Thank you for choosing [facility]  for your care. Our goal is always to provide you with excellent care. Hearing back from our patients is one way we can continue to improve our services. Please take a few minutes to complete the written survey that you may receive in the mail after your visit with us. Thank you!             Your Updated Medication List - Protect others around you: Learn how to safely use, store and throw away your medicines at www.disposemymeds.org.          This list is accurate as of: 5/17/17  9:45 AM.  Always use your most recent med list.                   Brand Name Dispense Instructions for use    acetaminophen 500 MG tablet    TYLENOL     Take 500-1,000 mg by mouth every 6 hours as needed Reported on 3/28/2017       hydrOXYzine 25 MG tablet    ATARAX    90 tablet    Take 1 tablet (25 mg) by mouth every 6 hours as needed for anxiety (Can take 50 mg before bedtime to help you sleep.)       ibuprofen 400 MG tablet    ADVIL/MOTRIN    120 tablet    Take 1 tablet (400 mg) by mouth every 6 hours as needed for moderate pain or fever       NEXPLANON 68 MG Impl   Generic drug:  etonogestrel      1 each by Subdermal route once Placed 5/2015       Sodium Chloride-Sodium Bicarb 1.57 G Pack    AYR SALINE NASAL NETI RINSE    40 each    Spray 1 Application in nostril 2 times daily as needed

## 2017-05-17 NOTE — PROGRESS NOTES
HPI:       June Yepez is a 27 year old  female without a significant past medical history who presents for new concern of :    Sore throat, some ear pain.  Some difficulty swallowing.   Started on Monday, coughing as well, non productive. Has rhinorrhea, congestion.   No fevers.   No SOB.     Daughter diagnosed with strep on Monday.    She has had strep throat twice in the last year treated with z pack.   She thinks one was treated here and one at an urgent care.          PMHX:     Patient Active Problem List   Diagnosis     Abnormal Pap smear of cervix     Allergic rhinitis     Posttraumatic stress disorder     Major depressive disorder, single episode, moderate (H)     Unprotected sexual intercourse     Morbid obesity (H)     Family history of malignant neoplasm of breast in relative diagnosed when younger than 45 years of age       Current Outpatient Prescriptions   Medication Sig Dispense Refill     ibuprofen (ADVIL/MOTRIN) 400 MG tablet Take 1 tablet (400 mg) by mouth every 6 hours as needed for moderate pain or fever 120 tablet 0     hydrOXYzine (ATARAX) 25 MG tablet Take 1 tablet (25 mg) by mouth every 6 hours as needed for anxiety (Can take 50 mg before bedtime to help you sleep.) 90 tablet 3     etonogestrel (NEXPLANON) 68 MG IMPL 1 each by Subdermal route once Placed 5/2015       acetaminophen (TYLENOL) 500 MG tablet Take 500-1,000 mg by mouth every 6 hours as needed Reported on 3/28/2017       Sodium Chloride-Sodium Bicarb (AYR SALINE NASAL NETI RINSE) 1.57 G PACK Spray 1 Application in nostril 2 times daily as needed (Patient not taking: Reported on 3/28/2017) 40 each 0          Allergies   Allergen Reactions     Penicillins      Face swelling     Egg Yolk      Allergic Eggs, throat will swollen up     Olive Oil      Throat swollen up              Review of Systems:   As above          Physical Exam:     Vitals:    05/17/17 0830   BP: 132/82   Pulse: 72   Resp: 18   Temp: 98  F (36.7  C)    TempSrc: Oral   SpO2: 99%   Weight: (!) 334 lb (151.5 kg)     Body mass index is 50.91 kg/(m^2).    Vitals reviewed and normal except BMI.  GENERAL APPEARANCE: healthy, alert and no distress  EYES: Eyes grossly normal to inspection,conjunctivae and sclerae normal  HENT: ear canals and TM's normal, nose and mouth without ulcers or lesions, oropharynx clear and oral mucous membranes moist. Tonsils 2+, erythematous, no exudate.  NECK: Slightly tender in mid anterior cervical chain, possible adenopathy, no asymmetry, masses, or scars and thyroid normal to palpation  RESP: lungs clear to auscultation - no rales, rhonchi or wheezes  CV: regular rates and rhythm, normal S1 S2, no S3 or S4, no murmur, click or rub, no peripheral edema and peripheral pulses 2+    Assessment and Plan     (R07.0) Throat pain  (primary encounter diagnosis)- [facility]. Likely viral. Did swab, but unfortunately used the wrong swab.   While staffing, mentioned adenopathy was difficult to appreciate d/t obesity and patient overheard and was displeased with this assessment. Refused to be re-swabbed and left to seek care elsewhere.   We were unable to apologize for this unfortunate event.     Plan: CANCELED: Rapid Strep Screen (Group) ([facility]),         CANCELED: Group A Strep Throat ([facility])       Cousneled to take Ibuprofen 600 mg Q8h for symptoms.  Options for treatment and follow-up care were reviewed with the patient and/or guardian. June Yepez and/or guardian engaged in the decision making process and verbalized understanding of the options discussed and agreed with the final plan.    Smiley Craft MD      Precepted today with: Poppy Ruiz MD was unable to see patient d/t her leaving.

## 2017-05-17 NOTE — PROGRESS NOTES
Preceptor Attestation:  Patient's case reviewed and discussed with Smiley Craft MD resident. I was unable to see the patient prior to her leaving the clinic as noted in Dr. Craft's note.     Supervising Physician:  Poppy Balbuena   [facility]

## 2018-03-31 ENCOUNTER — OFFICE VISIT - HEALTHEAST (OUTPATIENT)
Dept: FAMILY MEDICINE | Facility: CLINIC | Age: 28
End: 2018-03-31

## 2018-03-31 DIAGNOSIS — J02.9 SORE THROAT: ICD-10-CM

## 2018-03-31 DIAGNOSIS — J32.9 SINUSITIS: ICD-10-CM

## 2018-03-31 LAB — DEPRECATED S PYO AG THROAT QL EIA: NORMAL

## 2018-04-01 LAB — GROUP A STREP BY PCR: NORMAL

## 2018-10-29 ENCOUNTER — COMMUNICATION - HEALTHEAST (OUTPATIENT)
Dept: SCHEDULING | Facility: CLINIC | Age: 28
End: 2018-10-29

## 2019-11-05 ENCOUNTER — HEALTH MAINTENANCE LETTER (OUTPATIENT)
Age: 29
End: 2019-11-05

## 2020-11-22 ENCOUNTER — HEALTH MAINTENANCE LETTER (OUTPATIENT)
Age: 30
End: 2020-11-22

## 2021-05-29 ENCOUNTER — RECORDS - HEALTHEAST (OUTPATIENT)
Dept: ADMINISTRATIVE | Facility: CLINIC | Age: 31
End: 2021-05-29

## 2021-05-31 ENCOUNTER — RECORDS - HEALTHEAST (OUTPATIENT)
Dept: ADMINISTRATIVE | Facility: CLINIC | Age: 31
End: 2021-05-31

## 2021-06-01 ENCOUNTER — RECORDS - HEALTHEAST (OUTPATIENT)
Dept: ADMINISTRATIVE | Facility: CLINIC | Age: 31
End: 2021-06-01

## 2021-06-01 VITALS — WEIGHT: 293 LBS | BODY MASS INDEX: 53.88 KG/M2

## 2021-06-26 NOTE — PROGRESS NOTES
Progress Notes by Flavio Rosado DO at 3/31/2018  9:20 AM     Author: Flavio Rosado DO Service: -- Author Type: Physician    Filed: 4/2/2018  8:06 AM Encounter Date: 3/31/2018 Status: Signed    : Flavio Rosado DO (Physician)       Chief Complaint   Patient presents with   ? Illness     x 3 weeks, sore throat, ear pain, green phglem x 1 week then turned to brown     History of Present Illness: Nursing notes reviewed. No shortness of breath associated with her cough. No fevers or chills. She has some sinus congestion along with throat discomfort.  Azithromycin has help sinus infections in the past.    Review of systems: See history of present illness, otherwise negative.     Current Outpatient Prescriptions   Medication Sig Dispense Refill   ? lamoTRIgine (LAMICTAL) 200 MG tablet Take 1 tab daily in the morning     ? azithromycin (ZITHROMAX) 250 MG tablet Take 500 mg (2 x 250 mg tablets) on day 1 followed by 250 mg (1 tablet) on days 2-5. 6 tablet 0     No current facility-administered medications for this visit.        No past medical history on file.   No past surgical history on file.   Social History     Social History   ? Marital status: Single     Spouse name: N/A   ? Number of children: N/A   ? Years of education: N/A     Social History Main Topics   ? Smoking status: Never Smoker   ? Smokeless tobacco: Never Used   ? Alcohol use None   ? Drug use: None   ? Sexual activity: Not Asked     Other Topics Concern   ? None     Social History Narrative    Patient lives with family.       History   Smoking Status   ? Never Smoker   Smokeless Tobacco   ? Never Used      Exam:   Blood pressure 130/82, pulse 80, temperature 98.6  F (37  C), temperature source Oral, weight (!) 344 lb (156 kg), SpO2 100 %.    EXAM:   General: Vital signs reviewed. Patient is in no acute appearing distress. Breathing is non labored appearing. Patient is alert and oriented x 3.   ENT: Ear exam shows clear TMs with no injection,  nasal turbinates are injected and edematous with increased purulent rhinorrhea, no pharyngeal injection with increased post nasal drainage noted.  Neck: supple with no adenopathy.  Heart: Normal rate and rhythm without murmur  Lungs: Clear to auscultation with good air flow bilaterally.  Skin: warm and dry  Negative rapid strep test reviewed with patient at time of exam.  Assessment/Plan   1. Sore throat  Rapid Strep A Screen-Throat    Group A Strep, RNA Direct Detection, Throat   2. Sinusitis  azithromycin (ZITHROMAX) 250 MG tablet       Patient Instructions     We will notify you if the pending strep study is positive. Otherwise, you can assume the test was negative if not contacted over the next 48 hours. The azithromycin would treat this if you do have a strep infection. Also see info below. Be seen again in 3-4 days if symptoms are not better, sooner if feeling any worse.  Acute Bacterial Rhinosinusitis (ABRS)    Acute bacterial rhinosinusitis (ABRS) is an infection of your nasal cavity and sinuses. Its caused by bacteria. Acute means that youve had symptoms for less than 12 weeks.  Understanding your sinuses  The nasal cavity is the large air-filled space behind your nose. The sinuses are a group of spaces formed by the bones of your face. They connect with your nasal cavity. ABRS causes the tissue lining these spaces to become inflamed. Mucus may not drain normally. This leads to facial pain and other symptoms.  What causes ABRS?  ABRS most often follows an upper respiratory infection caused by a virus. Bacteria then infect the lining of your nasal cavity and sinuses. But you can also get ABRS if you have:    Nasal allergies    Long-term nasal swelling and congestion not caused by allergies    Blockage in the nose  Symptoms of ABRS  The symptoms of ABRS may be different for each person, and can include:    Nasal congestion    Runny nose    Fluid draining from the nose down the throat (postnasal  drip)    Headache    Cough    Pain in the sinuses    Thick, colored fluid from the nose (mucus)    Fever  Diagnosing ABRS  ABRS may be diagnosed if youve had an upper respiratory infection like a cold and cough for longer than 10 to 14 days. Your health care provider will ask about your symptoms and your medical history. The provider will check your vital signs, including your temperature. Youll have a physical exam. The health care provider will check your ears, nose, and throat. You likely wont need any tests. If ABRS comes back, you may have a culture or other tests.  Treatment for ABRS  Treatment may include:    Antibiotic medicine. This is for symptoms that last for at least 10 to 14 days.    Nasal corticosteroid medicine. Drops or spray used in the nose can lessen swelling and congestion.    Over-the-counter pain medicine. This is to lessen sinus pain and pressure.    Nasal decongestant medicine. Spray or drops may help to lessen congestion. Do not use them for more than a few days.    Salt wash (saline irrigation). This can help to loosen mucus.  Possible complications of ABRS  ABRS may come back or become long-term (chronic).  In rare cases, ABRS may cause complications such as:     Inflamed tissue around the brain and spinal cord (meningitis)    Inflamed tissue around the eyes (orbital cellulitis)    Inflamed bones around the sinuses (osteitis)  These problems may need to be treated in a hospital with intravenous (IV) antibiotic medicine or surgery.  When to call the health care provider  Call your health care provider if you have any of the following:    Symptoms that dont get better, or get worse    Symptoms that dont get better after 3 to 5 days on antibiotics    Trouble seeing    Swelling around your eyes    Confusion or trouble staying awake   Date Last Reviewed: 3/3/2015    0736-2738 [employer]. [address] [place] [address], [place] [address]. All rights reserved. This information is not  intended as a substitute for professional medical care. Always follow your healthcare professional's instructions.             Flavio Rosado, DO

## 2021-08-30 ENCOUNTER — HOSPITAL ENCOUNTER (EMERGENCY)
Facility: HOSPITAL | Age: 31
Discharge: HOME | End: 2021-08-30
Attending: EMERGENCY MEDICINE | Admitting: EMERGENCY MEDICINE
Payer: COMMERCIAL

## 2021-08-30 ENCOUNTER — APPOINTMENT (OUTPATIENT)
Dept: RADIOLOGY | Facility: HOSPITAL | Age: 31
End: 2021-08-30
Attending: EMERGENCY MEDICINE
Payer: COMMERCIAL

## 2021-08-30 VITALS
SYSTOLIC BLOOD PRESSURE: 171 MMHG | BODY MASS INDEX: 56.38 KG/M2 | HEART RATE: 80 BPM | TEMPERATURE: 98.5 F | OXYGEN SATURATION: 99 % | DIASTOLIC BLOOD PRESSURE: 90 MMHG | RESPIRATION RATE: 16 BRPM | WEIGHT: 293 LBS

## 2021-08-30 DIAGNOSIS — M25.512 ACUTE PAIN OF LEFT SHOULDER: ICD-10-CM

## 2021-08-30 DIAGNOSIS — M25.522 LEFT ELBOW PAIN: ICD-10-CM

## 2021-08-30 PROCEDURE — 73080 X-RAY EXAM OF ELBOW: CPT | Mod: LT

## 2021-08-30 PROCEDURE — 73030 X-RAY EXAM OF SHOULDER: CPT | Mod: LT

## 2021-08-30 PROCEDURE — 250N000013 HC RX MED GY IP 250 OP 250 PS 637: Performed by: EMERGENCY MEDICINE

## 2021-08-30 PROCEDURE — 99284 EMERGENCY DEPT VISIT MOD MDM: CPT

## 2021-08-30 RX ORDER — IBUPROFEN 800 MG/1
800 TABLET, FILM COATED ORAL EVERY 8 HOURS PRN
Qty: 21 TABLET | Refills: 0 | Status: SHIPPED | OUTPATIENT
Start: 2021-08-30 | End: 2021-09-07

## 2021-08-30 RX ORDER — CYCLOBENZAPRINE HCL 10 MG
10 TABLET ORAL 3 TIMES DAILY PRN
Qty: 15 TABLET | Refills: 0 | Status: SHIPPED | OUTPATIENT
Start: 2021-08-30 | End: 2021-09-05

## 2021-08-30 RX ADMIN — IBUPROFEN 800 MG: 600 TABLET ORAL at 11:47

## 2021-08-30 ASSESSMENT — ENCOUNTER SYMPTOMS
BACK PAIN: 0
VOMITING: 0
NECK PAIN: 0
SHORTNESS OF BREATH: 0
ABDOMINAL PAIN: 0
HEADACHES: 1
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
## 2021-08-30 NOTE — Clinical Note
June Yepez was seen and treated in our emergency department on 8/30/2021.  She may return to work on 08/31/2021.       If you have any questions or concerns, please don't hesitate to call.      Andrez Gallardo, DO

## 2021-09-19 ENCOUNTER — HEALTH MAINTENANCE LETTER (OUTPATIENT)
Age: 31
End: 2021-09-19

## 2022-01-09 ENCOUNTER — HEALTH MAINTENANCE LETTER (OUTPATIENT)
Age: 32
End: 2022-01-09

## 2022-11-21 ENCOUNTER — HEALTH MAINTENANCE LETTER (OUTPATIENT)
Age: 32
End: 2022-11-21

## 2023-03-15 ENCOUNTER — HOSPITAL ENCOUNTER (EMERGENCY)
Facility: HOSPITAL | Age: 33
Discharge: HOME | End: 2023-03-15
Attending: EMERGENCY MEDICINE | Admitting: EMERGENCY MEDICINE
Payer: COMMERCIAL

## 2023-03-15 VITALS
TEMPERATURE: 97.8 F | RESPIRATION RATE: 18 BRPM | HEIGHT: 67 IN | OXYGEN SATURATION: 99 % | BODY MASS INDEX: 45.99 KG/M2 | SYSTOLIC BLOOD PRESSURE: 167 MMHG | DIASTOLIC BLOOD PRESSURE: 116 MMHG | HEART RATE: 73 BPM | WEIGHT: 293 LBS

## 2023-03-15 DIAGNOSIS — L03.012 PARONYCHIA OF FINGER, LEFT: ICD-10-CM

## 2023-03-15 PROCEDURE — 99283 EMERGENCY DEPT VISIT LOW MDM: CPT | Mod: 25

## 2023-03-15 PROCEDURE — 10060 I&D ABSCESS SIMPLE/SINGLE: CPT

## 2023-03-15 RX ORDER — DOXYCYCLINE 100 MG/1
100 CAPSULE ORAL 2 TIMES DAILY
Qty: 14 CAPSULE | Refills: 0 | Status: SHIPPED | OUTPATIENT
Start: 2023-03-15 | End: 2023-03-22

## 2023-03-16 NOTE — DISCHARGE INSTRUCTIONS
You were seen in the emergency department for an infection on your left ring finger.  This kind of an infection is called a paronychia.  We were able to drain this at the bedside here.  Since there is some swelling and pain extending further up your finger we are also going to start you on a short course of antibiotics.  You should continue Tylenol 650 mg and ibuprofen 600 mg every 6 hours for pain and inflammation, these are both available over-the-counter.  You can keep your hand elevated to help with swelling.  You can follow-up with a specialist like [facility] if things or not improving over the next several days as expected

## 2023-03-16 NOTE — ED PROVIDER NOTES
EMERGENCY DEPARTMENT ENCOUNTER      NAME: June Yepez  AGE: 33 year old female  YOB: 1990  MRN: [id number]  EVALUATION DATE & TIME: 3/15/2023  7:36 PM    PCP: No Ref-Primary, Physician    ED PROVIDER: Hugh Amaro M.D.      Chief Complaint   Patient presents with     Hand Pain         FINAL IMPRESSION:  1. Paronychia of finger, left          ED COURSE & MEDICAL DECISION MAKIN year old female presents to the Emergency Department for evaluation of infection.  Patient has some swelling and evidence of a paronychia to the lateral aspect of her left ring finger.  This was incised and drained, see procedure note below.  She indicates some pain extending to the finger pad and also to the middle phalange in the affected digit, there is minimal swelling in this area, certainly nothing which seems to represent a severe rapidly progressive soft tissue infection.  Although drainage seems to have been adequate I am still going to put her on short course of doxycycline.  We discussed immersing the finger multiple times per day in warm soapy water .  She was given contact information for follow-up with [facility] if things or not improving as expected within the next several days.      Medical Decision Making    History:    Supplemental history from: Documented in chart, if applicable    External Record(s) reviewed: Documented in chart, if applicable.    Work Up:    Chart documentation includes differential considered and any EKGs or imaging independently interpreted by provider, where specified.    In additional to work up documented, I considered the following work up: Documented in chart, if applicable.    External consultation:    Discussion of management with another provider: Documented in chart, if applicable    Complicating factors:    Care impacted by chronic illness: N/A    Care affected by social determinants of health: N/A    Disposition considerations: Discharge. I prescribed  additional prescription strength medication(s) as charted. See documentation for any additional details.            MEDICATIONS GIVEN IN THE EMERGENCY:  Medications - No data to display    NEW PRESCRIPTIONS STARTED AT TODAY'S ER VISIT  Discharge Medication List as of 3/15/2023  8:02 PM      START taking these medications    Details   doxycycline hyclate (VIBRAMYCIN) 100 MG capsule Take 1 capsule (100 mg) by mouth 2 times daily for 7 days, Disp-14 capsule, R-0, Local Print                =================================================================    HPI    Patient information was obtained from: Patient     Use of : N/A         Alonaemiliano Yepez is a 33 year old female with no pertinent history on file who presents to this ED by walk in for evaluation of finger pain.    Patient developed a sudden onset of left hand 4th finger pain at 2:00pm. She says that this morning she had no pain. She notes she pulled a hangnail 2 days ago. She has pain and swelling to that finger. Denies trauma to that finger.       REVIEW OF SYSTEMS   All systems reviewed and negative except as noted in HPI.    PAST MEDICAL HISTORY:  Past Medical History:   Diagnosis Date     Allergic rhinitis, cause unspecified 3/19/2013     Bipolar disorder (H)      Depression      Depression      H/O abnormal Pap smear      Obesity      Obesity, unspecified 3/19/2013     PID (pelvic inflammatory disease) 7/2013    has not had a positive GC/Chlamydia, but symptoms are most consistent with this diagnosis     Posttraumatic stress disorder 3/19/2013       PAST SURGICAL HISTORY:  Past Surgical History:   Procedure Laterality Date     CHOLECYSTECTOMY  15yo     CHOLECYSTECTOMY             CURRENT MEDICATIONS:    No current facility-administered medications for this encounter.     Current Outpatient Medications   Medication     doxycycline hyclate (VIBRAMYCIN) 100 MG capsule     acetaminophen (TYLENOL) 500 MG tablet     etonogestrel (NEXPLANON) 68 MG  "IMPL     hydrOXYzine (ATARAX) 25 MG tablet     ibuprofen (ADVIL/MOTRIN) 400 MG tablet     Sodium Chloride-Sodium Bicarb (AYR SALINE NASAL NETI RINSE) 1.57 G PACK         ALLERGIES:  Allergies   Allergen Reactions     Penicillins      Face swelling     Egg Yolk      Allergic Eggs, throat will swollen up     Olive Oil      Throat swollen up       FAMILY HISTORY:  Family History   Problem Relation Age of Onset     Diabetes Mother         type 1 and 2     Hypertension Mother      Breast Cancer Mother 35     Asthma Other      Other - See Comments Other         brochitis     Heart Disease Other      Other - See Comments Other         phlebitis     Cerebrovascular Disease Other      Other - See Comments Other         Colitis     Diabetes Other      Thyroid Disease Other      Arthritis Other      Other - See Comments Other         mental health       SOCIAL HISTORY:   Social History     Socioeconomic History     Marital status: Single   Occupational History     Occupation: homemaker     Occupation: [employer]   Tobacco Use     Smoking status: Never     Smokeless tobacco: Never   Substance and Sexual Activity     Alcohol use: Yes     Comment: Alcoholic Drinks/day: occaisional     Drug use: No     Sexual activity: Yes     Partners: Male     Birth control/protection: None   Social History Narrative    Patient lives with family.       VITALS:  BP (!) 167/116   Pulse 73   Temp 97.8  F (36.6  C) (Temporal)   Resp 18   Ht 1.702 m (5' 7\")   Wt (!) 163.3 kg (360 lb)   SpO2 99%   BMI 56.38 kg/m      PHYSICAL EXAM    Constitutional: Well developed, Well nourished, NAD.  HENT: Normocephalic, Atraumatic. Neck Supple.  Eyes: EOMI, Conjunctiva normal.  Respiratory: Breathing comfortably on room air. Speaks full sentences easily. Lungs clear to ascultation.  Cardiovascular: Normal heart rate, Regular rhythm. No peripheral edema.  Abdomen: Soft, nontender  Musculoskeletal: There is a paronychia to the ulnar aspect of the left ring finger " and some soft tissue swelling involving the left finger pad and minimally involving the middle phalange on the left finger.  Patient does have fairly preserved range of motion at the DIP, PIP, and MCP joints of the affected finger.  No redness or erythema extending more proximally on the digit or hand.  Integument: Warm, Dry.  Neurologic: Alert & awake, Normal motor function, Normal sensory function, No focal deficits noted.   Psychiatric: Cooperative. Affect appropriate.       PROCEDURES:     PROCEDURE: Incision and Drainage   INDICATIONS: Localized abscess   PROCEDURE PROVIDER: Dr William Amaro   SITE: L ring finger   MEDICATION: 1 mLs of 1% Lidocaine without epinephrine   NOTE: The area was prepped with chlorhexidine and draped off in the usual sterile fashion.  Local anesthetic was injected subcutaneously with anesthesia effects demonstrated prior to proceeding.  The area of maximal fluctuance was opened with a # 11 Blade ([place]) using a Single Straight incision to allow for drainage.  The abscess was drained. Dressing was placed over the area.   COMPLEXITY: Simple       COMPLICATIONS: Patient tolerated procedure well, without complication         I, Lyndon Jordan, am serving as a scribe to document services personally performed by Dr. Hugh Amaro, based on my observation and the provider's statements to me. I, Hugh Amaro MD attest that Lyndon Jordan is acting in a scribe capacity, has observed my performance of the services and has documented them in accordance with my direction.    Hugh Amaro M.D.  Emergency Medicine  [facility] EMERGENCY DEPARTMENT  [address] [place] [id number]  [phone]  Dept: [phone]       Hugh Amaro MD  03/15/23 2019

## 2023-03-16 NOTE — ED TRIAGE NOTES
Patient states that at 1400 she developed a sudden onset of left fourth finger pain and edema. Pain increases with ROM. Edema noted. Patient states that she clipped a hangnail off of the finger the other day but otherwise no injury to the finger.      Triage Assessment     Row Name 03/15/23 1934       Triage Assessment (Adult)    Airway WDL WDL       Respiratory WDL    Respiratory WDL WDL       Skin Circulation/Temperature WDL    Skin Circulation/Temperature WDL WDL       Cardiac WDL    Cardiac WDL WDL       Peripheral/Neurovascular WDL    Peripheral Neurovascular WDL WDL       Cognitive/Neuro/Behavioral WDL    Cognitive/Neuro/Behavioral WDL WDL

## 2023-04-16 ENCOUNTER — HEALTH MAINTENANCE LETTER (OUTPATIENT)
Age: 33
End: 2023-04-16

## 2023-06-28 ENCOUNTER — APPOINTMENT (OUTPATIENT)
Dept: RADIOLOGY | Facility: HOSPITAL | Age: 33
End: 2023-06-28
Attending: FAMILY MEDICINE
Payer: COMMERCIAL

## 2023-06-28 ENCOUNTER — HOSPITAL ENCOUNTER (EMERGENCY)
Facility: HOSPITAL | Age: 33
Discharge: HOME | End: 2023-06-28
Attending: FAMILY MEDICINE | Admitting: FAMILY MEDICINE
Payer: COMMERCIAL

## 2023-06-28 VITALS
RESPIRATION RATE: 14 BRPM | SYSTOLIC BLOOD PRESSURE: 176 MMHG | HEIGHT: 67 IN | HEART RATE: 81 BPM | OXYGEN SATURATION: 99 % | BODY MASS INDEX: 45.99 KG/M2 | WEIGHT: 293 LBS | TEMPERATURE: 98.3 F | DIASTOLIC BLOOD PRESSURE: 84 MMHG

## 2023-06-28 DIAGNOSIS — J06.9 VIRAL UPPER RESPIRATORY TRACT INFECTION: ICD-10-CM

## 2023-06-28 DIAGNOSIS — J98.01 BRONCHOSPASM: ICD-10-CM

## 2023-06-28 LAB
FLUAV RNA SPEC QL NAA+PROBE: NEGATIVE
FLUBV RNA RESP QL NAA+PROBE: NEGATIVE
HOLD SPECIMEN: NORMAL
NT-PROBNP SERPL-MCNC: 97 PG/ML (ref 0–450)
RSV RNA SPEC NAA+PROBE: NEGATIVE
SARS-COV-2 RNA RESP QL NAA+PROBE: NEGATIVE
TROPONIN T SERPL HS-MCNC: <6 NG/L

## 2023-06-28 PROCEDURE — 94640 AIRWAY INHALATION TREATMENT: CPT

## 2023-06-28 PROCEDURE — 71046 X-RAY EXAM CHEST 2 VIEWS: CPT

## 2023-06-28 PROCEDURE — 83880 ASSAY OF NATRIURETIC PEPTIDE: CPT | Performed by: FAMILY MEDICINE

## 2023-06-28 PROCEDURE — 36415 COLL VENOUS BLD VENIPUNCTURE: CPT | Performed by: FAMILY MEDICINE

## 2023-06-28 PROCEDURE — 250N000009 HC RX 250: Performed by: FAMILY MEDICINE

## 2023-06-28 PROCEDURE — 258N000003 HC RX IP 258 OP 636: Performed by: FAMILY MEDICINE

## 2023-06-28 PROCEDURE — 250N000011 HC RX IP 250 OP 636: Mod: JZ | Performed by: FAMILY MEDICINE

## 2023-06-28 PROCEDURE — 93005 ELECTROCARDIOGRAM TRACING: CPT | Performed by: FAMILY MEDICINE

## 2023-06-28 PROCEDURE — 84484 ASSAY OF TROPONIN QUANT: CPT | Performed by: FAMILY MEDICINE

## 2023-06-28 PROCEDURE — 93005 ELECTROCARDIOGRAM TRACING: CPT | Performed by: STUDENT IN AN ORGANIZED HEALTH CARE EDUCATION/TRAINING PROGRAM

## 2023-06-28 PROCEDURE — 999N000157 HC STATISTIC RCP TIME EA 10 MIN

## 2023-06-28 PROCEDURE — 87637 SARSCOV2&INF A&B&RSV AMP PRB: CPT | Performed by: FAMILY MEDICINE

## 2023-06-28 PROCEDURE — 99285 EMERGENCY DEPT VISIT HI MDM: CPT | Mod: 25

## 2023-06-28 PROCEDURE — 96360 HYDRATION IV INFUSION INIT: CPT

## 2023-06-28 RX ORDER — ALBUTEROL SULFATE 90 UG/1
2 AEROSOL, METERED RESPIRATORY (INHALATION) EVERY 4 HOURS PRN
Qty: 18 G | Refills: 0 | Status: SHIPPED | OUTPATIENT
Start: 2023-06-28

## 2023-06-28 RX ORDER — DEXAMETHASONE SODIUM PHOSPHATE 10 MG/ML
10 INJECTION, SOLUTION INTRAMUSCULAR; INTRAVENOUS ONCE
Status: COMPLETED | OUTPATIENT
Start: 2023-06-28 | End: 2023-06-28

## 2023-06-28 RX ORDER — METHYLPREDNISOLONE 4 MG
TABLET, DOSE PACK ORAL
Qty: 21 TABLET | Refills: 0 | Status: SHIPPED | OUTPATIENT
Start: 2023-06-28 | End: 2023-07-17

## 2023-06-28 RX ORDER — IPRATROPIUM BROMIDE AND ALBUTEROL SULFATE 2.5; .5 MG/3ML; MG/3ML
3 SOLUTION RESPIRATORY (INHALATION) ONCE
Status: COMPLETED | OUTPATIENT
Start: 2023-06-28 | End: 2023-06-28

## 2023-06-28 RX ADMIN — DEXAMETHASONE SODIUM PHOSPHATE 10 MG: 10 INJECTION, SOLUTION INTRAMUSCULAR; INTRAVENOUS at 14:47

## 2023-06-28 RX ADMIN — SODIUM CHLORIDE 1000 ML: 9 INJECTION, SOLUTION INTRAVENOUS at 14:48

## 2023-06-28 RX ADMIN — IPRATROPIUM BROMIDE AND ALBUTEROL SULFATE 3 ML: .5; 3 SOLUTION RESPIRATORY (INHALATION) at 14:26

## 2023-06-28 ASSESSMENT — ENCOUNTER SYMPTOMS
VOMITING: 0
DIARRHEA: 1
SHORTNESS OF BREATH: 1

## 2023-06-28 ASSESSMENT — ACTIVITIES OF DAILY LIVING (ADL): ADLS_ACUITY_SCORE: 35

## 2023-06-28 NOTE — ED NOTES
Patient discharged at 1622 to home; Patient provided with all discharge paperwork and educational material. All questions answered to patient's satisfaction.

## 2023-06-28 NOTE — ED TRIAGE NOTES
Chest pain started today at 11am. Patient also has flu like symptoms such as cough, low grade fever last night. Patient states she also has sore throat and rhinorrhea. EKG completed prior to triage.     Triage Assessment     Row Name 06/28/23 7165       Triage Assessment (Adult)    Airway WDL WDL       Respiratory WDL    Respiratory WDL WDL       Skin Circulation/Temperature WDL    Skin Circulation/Temperature WDL WDL       Cardiac WDL    Cardiac WDL WDL       Peripheral/Neurovascular WDL    Peripheral Neurovascular WDL WDL       Cognitive/Neuro/Behavioral WDL    Cognitive/Neuro/Behavioral WDL WDL

## 2023-06-28 NOTE — ED PROVIDER NOTES
EMERGENCY DEPARTMENT ENCOUNTER      NAME: June Yepez  AGE: 33 year old female  YOB: 1990  MRN: [id number]  EVALUATION DATE & TIME: No admission date for patient encounter.    PCP: No Ref-Primary, Physician    ED PROVIDER: Dave Alvarado M.D.    Chief Complaint   Patient presents with     Chest Pain     Dizziness       FINAL IMPRESSION:  1. Viral upper respiratory tract infection    2. Bronchospasm      ED COURSE & MEDICAL DECISION MAKING:    Pertinent Labs & Imaging studies independently interpreted by me. (See chart for details)  2:05 PM Patient seen and examined, reviewed clinic records from earlier today at urgent care, as well as prior telemedicine visit on May 8, 2023 for bipolar disorder and PTSD.  Patient presents today with upper respiratory symptoms for several days, now with some chest tightness.  Not pleuritic, consider D-dimer or CT PE study but low risk by Wells criteria and not indicated by [facility].  Labs and chest x-ray ordered along with fluids and DuoNeb although patient really has no wheezing.  Troponin and BNP ordered, EKG is reassuring.  4:06 PM delay in care due to redraw for troponin.  Labs independently interpreted by me demonstrate normal troponin, negative influenza and COVID, normal BNP.  Chest x-ray independently interpreted by me does not demonstrate any acute findings.  Patient is stable for discharge with continued symptom management.  No evidence for acute coronary syndrome, pneumonia, pulmonary embolism, or other life-threatening cause of symptoms today.  Patient does feel better after nebulizer treatment.  Medrol Dosepak and albuterol prescribed    At the conclusion of the encounter I discussed the results of all of the tests and the disposition. The questions were answered. The patient or family acknowledged understanding and was agreeable with the care plan.     Medical Decision Making    History:    Supplemental history from: Documented in chart, if  applicable    External Record(s) reviewed: Documented in chart, if applicable.    Work Up:    Chart documentation includes differential considered and any EKGs or imaging independently interpreted by provider, where specified.    In additional to work up documented, I considered the following work up: Documented in chart, if applicable.    External consultation:    Discussion of management with another provider: Documented in chart, if applicable    Complicating factors:    Care impacted by chronic illness: Mental Health    Care affected by social determinants of health: N/A    Disposition considerations: Discharge. No recommendations on prescription strength medication(s). I considered admission, but discharged the patient after share decision making conversation.    EKG:    Performed at: 1:32 PM  Impression: Normal EKG  Rate: 78  Rhythm: Sinus  Axis: Normal  ID Interval: 54  QRS Interval: 80  QTc Interval: 444  ST Changes: No acute ischemic changes  Comparison: April 2021, no changes    I have independently reviewed and interpreted the EKG(s) documented above.    PROCEDURES:       MEDICATIONS GIVEN IN THE EMERGENCY:  Medications   0.9% sodium chloride BOLUS (1,000 mLs Intravenous $New Bag 6/28/23 5217)   ipratropium - albuterol 0.5 mg/2.5 mg/3 mL (DUONEB) neb solution 3 mL (3 mLs Nebulization $Given 6/28/23 2453)   dexamethasone PF (DECADRON) injection 10 mg (10 mg Intravenous $Given 6/28/23 4911)       NEW PRESCRIPTIONS STARTED AT TODAY'S ER VISIT  New Prescriptions    ALBUTEROL (PROAIR HFA/PROVENTIL HFA/VENTOLIN HFA) 108 (90 BASE) MCG/ACT INHALER    Inhale 2 puffs into the lungs every 4 hours as needed for shortness of breath, wheezing or cough (chest tightness)    METHYLPREDNISOLONE (MEDROL DOSEPAK) 4 MG TABLET THERAPY PACK    Follow Package Directions       =================================================================    HPI    Patient information was obtained from: Patient      June Yepez is a 33 year  old female with a pertinent history of PTSD, anxiety, bipolar II disorder, morbid obesity, and prediabetes who presents to this ED by walk in for evaluation of chest pain and dizziness.    Patient reports that she has been sick with upper respiratory symptoms including a productive cough, fevers, rhinorrhea, and sore throat since 6/25 (3 days ago). She also endorses diarrhea. Patient reports that today around 11:00 AM while she was sitting down at work she developed chest pressure. She says that it feels like someone is pressing down on her chest. Patient endorses associated shortness of breath with the chest pressure as well. She also endorses smoking. Patient reports using Ibuprofen, DayQuil, and NyQuil for her symptoms. Patient denies vomiting, any chance of pregnancy, or any other concerns at this time.    REVIEW OF SYSTEMS   Review of Systems   Respiratory: Positive for shortness of breath.    Cardiovascular: Positive for chest pain (Chest pressure).   Gastrointestinal: Positive for diarrhea. Negative for vomiting.   All other systems reviewed and are negative.     All other systems reviewed and negative    PAST MEDICAL HISTORY:  Past Medical History:   Diagnosis Date     Allergic rhinitis, cause unspecified 3/19/2013     Bipolar disorder (H)      Depression      Depression      H/O abnormal Pap smear      Obesity      Obesity, unspecified 3/19/2013     PID (pelvic inflammatory disease) 7/2013    has not had a positive GC/Chlamydia, but symptoms are most consistent with this diagnosis     Posttraumatic stress disorder 3/19/2013       PAST SURGICAL HISTORY:  Past Surgical History:   Procedure Laterality Date     CHOLECYSTECTOMY  17yo     CHOLECYSTECTOMY         CURRENT MEDICATIONS:    No current facility-administered medications for this encounter.     Current Outpatient Medications   Medication     albuterol (PROAIR HFA/PROVENTIL HFA/VENTOLIN HFA) 108 (90 Base) MCG/ACT inhaler     methylPREDNISolone (MEDROL  "DOSEPAK) 4 MG tablet therapy pack     acetaminophen (TYLENOL) 500 MG tablet     etonogestrel (NEXPLANON) 68 MG IMPL     hydrOXYzine (ATARAX) 25 MG tablet     ibuprofen (ADVIL/MOTRIN) 400 MG tablet     Sodium Chloride-Sodium Bicarb (AYR SALINE NASAL NETI RINSE) 1.57 G PACK       ALLERGIES:  Allergies   Allergen Reactions     Hydrocodone-Acetaminophen Shortness Of Breath     Penicillins      Face swelling     Acetaminophen Other (See Comments)     Egg Yolk      Allergic Eggs, throat will swollen up     Olive Oil      Throat swollen up       FAMILY HISTORY:  Family History   Problem Relation Age of Onset     Diabetes Mother         type 1 and 2     Hypertension Mother      Breast Cancer Mother 35     Asthma Other      Other - See Comments Other         brochitis     Heart Disease Other      Other - See Comments Other         phlebitis     Cerebrovascular Disease Other      Other - See Comments Other         Colitis     Diabetes Other      Thyroid Disease Other      Arthritis Other      Other - See Comments Other         mental health       SOCIAL HISTORY:   Social History     Socioeconomic History     Marital status: Single   Occupational History     Occupation: homemaker     Occupation: [employer]   Tobacco Use     Smoking status: Never     Smokeless tobacco: Never   Substance and Sexual Activity     Alcohol use: Yes     Comment: Alcoholic Drinks/day: occaisional     Drug use: No     Sexual activity: Yes     Partners: Male     Birth control/protection: None   Social History Narrative    Patient lives with family.       VITALS:  BP (!) 168/92   Pulse 84   Temp 98.3  F (36.8  C) (Temporal)   Resp 22   Ht 1.702 m (5' 7\")   Wt (!) 158.8 kg (350 lb)   SpO2 96%   BMI 54.82 kg/m      PHYSICAL EXAM:  Physical Exam  Vitals and nursing note reviewed.   Constitutional:       Appearance: Normal appearance.   HENT:      Head: Normocephalic and atraumatic.      Right Ear: External ear normal.      Left Ear: External ear normal.    "   Nose: Nose normal.      Mouth/Throat:      Mouth: Mucous membranes are moist.   Eyes:      Extraocular Movements: Extraocular movements intact.      Conjunctiva/sclera: Conjunctivae normal.      Pupils: Pupils are equal, round, and reactive to light.   Cardiovascular:      Rate and Rhythm: Normal rate and regular rhythm.   Pulmonary:      Effort: Pulmonary effort is normal.      Breath sounds: Normal breath sounds. No wheezing or rales.   Abdominal:      General: Abdomen is flat. There is no distension.      Palpations: Abdomen is soft.      Tenderness: There is no abdominal tenderness. There is no guarding.   Musculoskeletal:         General: Normal range of motion.      Cervical back: Normal range of motion and neck supple.      Right lower leg: No edema.      Left lower leg: No edema.   Lymphadenopathy:      Cervical: No cervical adenopathy.   Skin:     General: Skin is warm and dry.   Neurological:      General: No focal deficit present.      Mental Status: She is alert and oriented to person, place, and time. Mental status is at baseline.      Comments: No gross focal neurologic deficits   Psychiatric:         Mood and Affect: Mood normal.         Behavior: Behavior normal.         Thought Content: Thought content normal.          LAB:  All pertinent labs reviewed and interpreted.  Results for orders placed or performed during the hospital encounter of 06/28/23   Chest XR,  PA & LAT    Impression    IMPRESSION: Negative chest.  The lungs are clear and there are no pleural effusions. Normal heart size.   Symptomatic Influenza A/B, RSV, & SARS-CoV2 PCR (COVID-19) Nose    Specimen: Nose; Swab   Result Value Ref Range    Influenza A PCR Negative Negative    Influenza B PCR Negative Negative    RSV PCR Negative Negative    SARS CoV2 PCR Negative Negative   Result Value Ref Range    Troponin T, High Sensitivity <6 <=14 ng/L   Nt probnp inpatient (BNP)   Result Value Ref Range    N terminal Pro BNP Inpatient 97 0 -  450 pg/mL   Extra Blue Top Tube   Result Value Ref Range    Hold Specimen JIC    Extra Red Top Tube   Result Value Ref Range    Hold Specimen JIC    Extra Purple Top Tube   Result Value Ref Range    Hold Specimen JIC        RADIOLOGY:  Reviewed all pertinent imaging. Please see official radiology report.  Chest XR,  PA & LAT   Final Result   IMPRESSION: Negative chest.  The lungs are clear and there are no pleural effusions. Normal heart size.          I, Malik Gilbert, am serving as a scribe to document services personally performed by Dr. Alvarado based on my observation and the provider's statements to me. I, Dave Alvarado MD attest that Malik Gilbert is acting in a scribe capacity, has observed my performance of the services and has documented them in accordance with my direction.    Dave Alvarado M.D.  Emergency Medicine  [facility] EMERGENCY DEPARTMENT  [facility] [facility] [address]  [phone]  Dept: [phone]       Dave Alvarado MD  06/28/23 1603       Dave Alvarado MD  06/28/23 5403

## 2023-06-28 NOTE — Clinical Note
June Yepez was seen and treated in our emergency department on 6/28/2023.  She may return to work on 06/30/2023.       If you have any questions or concerns, please don't hesitate to call.      Dave Alvarado MD

## 2023-06-30 LAB
ATRIAL RATE - MUSE: 78 BPM
DIASTOLIC BLOOD PRESSURE - MUSE: NORMAL MMHG
INTERPRETATION ECG - MUSE: NORMAL
P AXIS - MUSE: 57 DEGREES
PR INTERVAL - MUSE: 154 MS
QRS DURATION - MUSE: 80 MS
QT - MUSE: 390 MS
QTC - MUSE: 444 MS
R AXIS - MUSE: 3 DEGREES
SYSTOLIC BLOOD PRESSURE - MUSE: NORMAL MMHG
T AXIS - MUSE: 12 DEGREES
VENTRICULAR RATE- MUSE: 78 BPM

## 2023-07-04 ENCOUNTER — APPOINTMENT (OUTPATIENT)
Dept: CT IMAGING | Facility: HOSPITAL | Age: 33
End: 2023-07-04
Attending: EMERGENCY MEDICINE
Payer: COMMERCIAL

## 2023-07-04 ENCOUNTER — HOSPITAL ENCOUNTER (EMERGENCY)
Facility: HOSPITAL | Age: 33
Discharge: HOME | End: 2023-07-04
Attending: EMERGENCY MEDICINE | Admitting: EMERGENCY MEDICINE
Payer: COMMERCIAL

## 2023-07-04 VITALS
HEART RATE: 66 BPM | BODY MASS INDEX: 45.99 KG/M2 | DIASTOLIC BLOOD PRESSURE: 77 MMHG | WEIGHT: 293 LBS | HEIGHT: 67 IN | RESPIRATION RATE: 16 BRPM | TEMPERATURE: 98.4 F | SYSTOLIC BLOOD PRESSURE: 147 MMHG | OXYGEN SATURATION: 98 %

## 2023-07-04 DIAGNOSIS — F41.9 ANXIETY: ICD-10-CM

## 2023-07-04 DIAGNOSIS — R07.9 ACUTE CHEST PAIN: ICD-10-CM

## 2023-07-04 LAB
ANION GAP SERPL CALCULATED.3IONS-SCNC: 10 MMOL/L (ref 7–15)
BASOPHILS # BLD AUTO: 0 10E3/UL (ref 0–0.2)
BASOPHILS NFR BLD AUTO: 0 %
BUN SERPL-MCNC: 10.9 MG/DL (ref 6–20)
CALCIUM SERPL-MCNC: 9.7 MG/DL (ref 8.6–10)
CHLORIDE SERPL-SCNC: 102 MMOL/L (ref 98–107)
CREAT SERPL-MCNC: 0.73 MG/DL (ref 0.51–0.95)
D DIMER PPP FEU-MCNC: 0.52 UG/ML FEU (ref 0–0.5)
DEPRECATED HCO3 PLAS-SCNC: 27 MMOL/L (ref 22–29)
EOSINOPHIL # BLD AUTO: 0.2 10E3/UL (ref 0–0.7)
EOSINOPHIL NFR BLD AUTO: 1 %
ERYTHROCYTE [DISTWIDTH] IN BLOOD BY AUTOMATED COUNT: 14.3 % (ref 10–15)
GFR SERPL CREATININE-BSD FRML MDRD: >90 ML/MIN/1.73M2
GLUCOSE SERPL-MCNC: 102 MG/DL (ref 70–99)
HCG SERPL QL: NEGATIVE
HCT VFR BLD AUTO: 39 % (ref 35–47)
HGB BLD-MCNC: 12.5 G/DL (ref 11.7–15.7)
IMM GRANULOCYTES # BLD: 0 10E3/UL
IMM GRANULOCYTES NFR BLD: 0 %
LYMPHOCYTES # BLD AUTO: 4.1 10E3/UL (ref 0.8–5.3)
LYMPHOCYTES NFR BLD AUTO: 35 %
MCH RBC QN AUTO: 25.5 PG (ref 26.5–33)
MCHC RBC AUTO-ENTMCNC: 32.1 G/DL (ref 31.5–36.5)
MCV RBC AUTO: 80 FL (ref 78–100)
MONOCYTES # BLD AUTO: 0.6 10E3/UL (ref 0–1.3)
MONOCYTES NFR BLD AUTO: 5 %
NEUTROPHILS # BLD AUTO: 6.7 10E3/UL (ref 1.6–8.3)
NEUTROPHILS NFR BLD AUTO: 59 %
NRBC # BLD AUTO: 0 10E3/UL
NRBC BLD AUTO-RTO: 0 /100
PLATELET # BLD AUTO: 400 10E3/UL (ref 150–450)
POTASSIUM SERPL-SCNC: 3.7 MMOL/L (ref 3.4–5.3)
RBC # BLD AUTO: 4.9 10E6/UL (ref 3.8–5.2)
SODIUM SERPL-SCNC: 139 MMOL/L (ref 136–145)
TROPONIN T SERPL HS-MCNC: <6 NG/L
WBC # BLD AUTO: 11.7 10E3/UL (ref 4–11)

## 2023-07-04 PROCEDURE — 71275 CT ANGIOGRAPHY CHEST: CPT

## 2023-07-04 PROCEDURE — 84484 ASSAY OF TROPONIN QUANT: CPT | Performed by: EMERGENCY MEDICINE

## 2023-07-04 PROCEDURE — 250N000011 HC RX IP 250 OP 636: Mod: JZ | Performed by: EMERGENCY MEDICINE

## 2023-07-04 PROCEDURE — 99285 EMERGENCY DEPT VISIT HI MDM: CPT | Mod: 25

## 2023-07-04 PROCEDURE — 96374 THER/PROPH/DIAG INJ IV PUSH: CPT | Mod: 59

## 2023-07-04 PROCEDURE — 93005 ELECTROCARDIOGRAM TRACING: CPT | Performed by: EMERGENCY MEDICINE

## 2023-07-04 PROCEDURE — 85379 FIBRIN DEGRADATION QUANT: CPT | Performed by: EMERGENCY MEDICINE

## 2023-07-04 PROCEDURE — 250N000011 HC RX IP 250 OP 636: Performed by: EMERGENCY MEDICINE

## 2023-07-04 PROCEDURE — 80048 BASIC METABOLIC PNL TOTAL CA: CPT | Performed by: EMERGENCY MEDICINE

## 2023-07-04 PROCEDURE — 85025 COMPLETE CBC W/AUTO DIFF WBC: CPT | Performed by: EMERGENCY MEDICINE

## 2023-07-04 PROCEDURE — 36415 COLL VENOUS BLD VENIPUNCTURE: CPT | Performed by: EMERGENCY MEDICINE

## 2023-07-04 PROCEDURE — 84703 CHORIONIC GONADOTROPIN ASSAY: CPT | Performed by: EMERGENCY MEDICINE

## 2023-07-04 RX ORDER — LORAZEPAM 0.5 MG/1
0.5 TABLET ORAL EVERY 6 HOURS PRN
Qty: 10 TABLET | Refills: 0 | Status: SHIPPED | OUTPATIENT
Start: 2023-07-04

## 2023-07-04 RX ORDER — IOPAMIDOL 755 MG/ML
90 INJECTION, SOLUTION INTRAVASCULAR ONCE
Status: COMPLETED | OUTPATIENT
Start: 2023-07-04 | End: 2023-07-04

## 2023-07-04 RX ORDER — LORAZEPAM 2 MG/ML
1 INJECTION INTRAMUSCULAR ONCE
Status: COMPLETED | OUTPATIENT
Start: 2023-07-04 | End: 2023-07-04

## 2023-07-04 RX ADMIN — IOPAMIDOL 90 ML: 755 INJECTION, SOLUTION INTRAVENOUS at 13:52

## 2023-07-04 RX ADMIN — LORAZEPAM 1 MG: 2 INJECTION INTRAMUSCULAR; INTRAVENOUS at 13:24

## 2023-07-04 ASSESSMENT — ENCOUNTER SYMPTOMS
SHORTNESS OF BREATH: 1
COUGH: 1
FEVER: 0
JOINT SWELLING: 1

## 2023-07-04 ASSESSMENT — ACTIVITIES OF DAILY LIVING (ADL): ADLS_ACUITY_SCORE: 33

## 2023-07-04 NOTE — ED PROVIDER NOTES
Emergency Department Encounter      NAME: June Yepez  AGE: 33 year old female  YOB: 1990  MRN: [id number]  EVALUATION DATE & TIME: No admission date for patient encounter.    PCP: No Ref-Primary, Physician    ED PROVIDER: Declan Hill M.D.      Chief Complaint   Patient presents with     Chest Pain     Shortness of Breath         FINAL IMPRESSION:  1. Anxiety    2. Acute chest pain        MEDICAL DECISION MAKIN:03 PM I met with the patient, obtained history, performed an initial exam, and discussed options and plan for diagnostics and treatment here in the ED.   2:17 PM I updated the patient on laboratory and imaging results. I rechecked the patient to see how she is doing with treatment.    This patient is a 39-year-old female with a history of bipolar disorder, anxiety and asthma who presents to the ER with a chief complaint of chest pain and shortness of breath.  I reviewed the patient's medical records from her [facility] ER evaluation on  for chest pain and dizziness.  She was seen at that time by Dr. Alvarado.  An EKG she, lab work and chest x-ray were fine.  She was started on a albuterol inhaler and a Medrol Dosepak.  She says that these medications have not helped and that she continues to have chest pain and shortness of breath.  She has a dry cough at times as well.  She describes the feeling in her chest as a squeezing sensation.  She does not have any history of diabetes, high blood pressure or high cholesterol.  She does smoke 1/4 pack a day and her mother had heart disease in her 40s.  A EKG was done initially on the patient and there was no evidence of acute MI or cardiac ischemia.  I independently reviewed and interpreted the EKG.  She was placed in a room and kept on the cardiac monitor.  She remained in sinus rhythm while in the ER.  The patient had stable normal vital signs and a normal clear lungs and normal heart sounds.  Lab work was done and there were no  significant abnormalities that would explain chest pain or shortness of breath.  The troponin was not detectable.  The D-dimer was slightly elevated 0.52.  I did do a chest CT pulmonary angiogram and there was no evidence of pneumonia, pneumothorax or pulmonary embolism.  I independently reviewed and interpreted the CTA.  I thought the patient appeared anxious and she was given a milligram of IV Ativan.  This relieved her symptoms in the ER.  The patient does have some hydroxyzine at home but says it does not help with her anxiety.  I am going to give her a few Ativan tablets to use and she is going to follow-up with her primary care doctor to discuss the anxiety that she has.  I am also going to refer her to the cardiology rapid access clinic because of her smoking history, nature of her chest pain and a positive family history.    Pertinent Labs & Imaging studies reviewed. (See chart for details)    The importance of close follow up was discussed. We reviewed warning signs and symptoms, and I instructed Ms. Yepez to return to the emergency department immediately if she develops any new or worsening symptoms. I provided additional verbal discharge instructions. Ms. Yepez expressed understanding and agreement with this plan of care, her questions were answered, and she was discharged in stable condition.     Medical Decision Making     History:    Supplemental history from: Documented in chart, if applicable    External Record(s) reviewed: Documented in chart, if applicable.     Work Up:    Chart documentation includes differential considered and any EKGs or imaging independently interpreted by provider, where specified.    In additional to work up documented, I considered the following work up: Documented in chart, if applicable.     External consultation:    Discussion of management with another provider: Documented in chart, if applicable     Complicating factors:    Care impacted by chronic illness:  "Asthma    Care affected by social determinants of health: Access to Medical Care     Disposition considerations:  Discharge patient home with a prescription for lorazepam and a referral to the cardiology rapid access clinic.      MEDICATIONS GIVEN IN THE EMERGENCY:  Medications   LORazepam (ATIVAN) injection 1 mg (1 mg Intravenous $Given 7/4/23 1324)   iopamidol (ISOVUE-370) solution 90 mL (90 mLs Intravenous $Given 7/4/23 1352)       NEW PRESCRIPTIONS STARTED AT TODAY'S ER VISIT:  New Prescriptions    LORAZEPAM (ATIVAN) 0.5 MG TABLET    Take 1 tablet (0.5 mg) by mouth every 6 hours as needed for anxiety          =================================================================    HPI    Patient information was obtained from: Patient    Use of : N/A      June ESHA Yepez is a 33 year old female with a past medical history of PID, depression, PTSD, bipolar disorder, adjustment disorder, anxiety, insomnia, asthma, and prediabetes, who presents to the ED by private car with a concern of chest pain and shortness of breath.    Per chart review, patient visited [facility] Emergency Department on 6/28, with a concern of chest pain and dizziness. Not pleuritic. EKG is reassuring.  Labs: normal troponin, negative influenza and COVID, normal BNP. Chest x-ray independently didn't show any acute findings. No evidence for acute coronary syndrome, pneumonia, pulmonary embolism, or other life-threatening cause of symptoms today. Patient felt better after nebulizer treatment. Medrol Dosepak and albuterol prescribed. No other medical concerns.    Patient reports an onset of ongoing chest pain and shortness of breath which occured on 6/28. She notes her shortness of breath occurs at rest. Patient endorses a dry cough and right foot swelling, with pain when walking. She notes her symptoms are the same but \"slightly [got] worse\". She currently feels as if there's \"something squeezing [her] in\". " Patient reports her prescribed treatment from her last ED visit hasn't been alleviating her symptoms. She last took her medication today, but didn't feel anything improved. Patient reports her fever is gone. She denies coughing with phlegm.     Patient reports having a recent history of asthma, also had it when she was a child. She smokes a quarter pack of cigarettes daily. Patient has a family history of hypertension and heart attacks. Her mother had one at age 40 and 55. No other medical concerns.    REVIEW OF SYSTEMS   Review of Systems   Constitutional: Negative for fever.   Respiratory: Positive for cough (dry but no phlegm) and shortness of breath.    Cardiovascular: Positive for chest pain.   Musculoskeletal: Positive for joint swelling (right foot, pain when walking).   All other systems reviewed and are negative.       PAST MEDICAL HISTORY:  Past Medical History:   Diagnosis Date     Allergic rhinitis, cause unspecified 03/19/2013     Bipolar disorder (H)      Depression      Depression      H/O abnormal Pap smear      Obesity      Obesity, unspecified 03/19/2013     PID (pelvic inflammatory disease) 07/01/2013    has not had a positive GC/Chlamydia, but symptoms are most consistent with this diagnosis     Posttraumatic stress disorder 03/19/2013     Uncomplicated asthma        PAST SURGICAL HISTORY:  Past Surgical History:   Procedure Laterality Date     CHOLECYSTECTOMY  17yo     CHOLECYSTECTOMY         CURRENT MEDICATIONS:    No current facility-administered medications for this encounter.    Current Outpatient Medications:      LORazepam (ATIVAN) 0.5 MG tablet, Take 1 tablet (0.5 mg) by mouth every 6 hours as needed for anxiety, Disp: 10 tablet, Rfl: 0     acetaminophen (TYLENOL) 500 MG tablet, Take 500-1,000 mg by mouth every 6 hours as needed Reported on 3/28/2017, Disp: , Rfl:      albuterol (PROAIR HFA/PROVENTIL HFA/VENTOLIN HFA) 108 (90 Base) MCG/ACT inhaler, Inhale 2 puffs into the lungs every 4  hours as needed for shortness of breath, wheezing or cough (chest tightness), Disp: 18 g, Rfl: 0     etonogestrel (NEXPLANON) 68 MG IMPL, 1 each by Subdermal route once Placed 5/2015, Disp: , Rfl:      hydrOXYzine (ATARAX) 25 MG tablet, Take 1 tablet (25 mg) by mouth every 6 hours as needed for anxiety (Can take 50 mg before bedtime to help you sleep.), Disp: 90 tablet, Rfl: 3     ibuprofen (ADVIL/MOTRIN) 400 MG tablet, Take 1 tablet (400 mg) by mouth every 6 hours as needed for moderate pain or fever, Disp: 120 tablet, Rfl: 0     methylPREDNISolone (MEDROL DOSEPAK) 4 MG tablet therapy pack, Follow Package Directions, Disp: 21 tablet, Rfl: 0     Sodium Chloride-Sodium Bicarb (AYR SALINE NASAL NETI RINSE) 1.57 G PACK, Spray 1 Application in nostril 2 times daily as needed (Patient not taking: Reported on 3/28/2017), Disp: 40 each, Rfl: 0    ALLERGIES:  Allergies   Allergen Reactions     Hydrocodone-Acetaminophen Shortness Of Breath     Penicillins      Face swelling     Acetaminophen Other (See Comments)     Egg Yolk      Allergic Eggs, throat will swollen up     Olive Oil      Throat swollen up       FAMILY HISTORY:  Family History   Problem Relation Age of Onset     Diabetes Mother         type 1 and 2     Hypertension Mother      Breast Cancer Mother 35     Asthma Other      Other - See Comments Other         brochitis     Heart Disease Other      Other - See Comments Other         phlebitis     Cerebrovascular Disease Other      Other - See Comments Other         Colitis     Diabetes Other      Thyroid Disease Other      Arthritis Other      Other - See Comments Other         mental health       SOCIAL HISTORY:   Social History     Socioeconomic History     Marital status: Single   Occupational History     Occupation: homemaker     Occupation: [employer]   Tobacco Use     Smoking status: Never     Smokeless tobacco: Never   Substance and Sexual Activity     Alcohol use: Yes     Comment: Alcoholic Drinks/day:  "occaisional     Drug use: No     Sexual activity: Yes     Partners: Male     Birth control/protection: None   Social History Narrative    Patient lives with family.       PHYSICAL EXAM:    Vitals: BP (!) 140/86   Pulse 70   Temp 98.4  F (36.9  C) (Oral)   Resp 16   Ht 1.702 m (5' 7\")   Wt (!) 160 kg (352 lb 11.8 oz)   SpO2 100%   BMI 55.25 kg/m     Constitutional:  Somewhat anxious appearing and breathless black female who is not tachypneic and there is no audible wheezing.  HEAD:Normocephalic, atraumatic,   Eyes: PERRLA, EOM intact, conjunctiva clear, no discharge  ENT: mucous membranes moist, nose normal.   Neck- Supple, gross ROM intact.  No JVD.  No palpable nodes.  Pulmonary: Clear to auscultation bilaterally, no respiratory distress, no wheezing, speaks full sentences easily. No retractions and moving air well.   Chest: No chest wall tenderness  Cardiovascular: Normal heart rate, regular rhythm, no murmurs. No lower extremity edema, 2+ DP pulses.   GI: Soft, no tenderness to deep palpation in all quadrants, not distended, no masses.  No hepatosplenomegaly.  Musculoskeletal: No calf tenderness or lower leg edema.  Moving all 4 extremities intentionally and without pain. No obvious deformity.  Back: No CVA tenderness  Skin: Warm, dry, no rash.  Neurologic: Alert & oriented x 3, speech clear, moving all extremities spontaneously   Psychiatric: Affect normal, cooperative.     LAB:  All pertinent labs reviewed and interpreted.  Labs Ordered and Resulted from Time of ED Arrival to Time of ED Departure   BASIC METABOLIC PANEL - Abnormal       Result Value    Sodium 139      Potassium 3.7      Chloride 102      Carbon Dioxide (CO2) 27      Anion Gap 10      Urea Nitrogen 10.9      Creatinine 0.73      Calcium 9.7      Glucose 102 (*)     GFR Estimate >90     D DIMER QUANTITATIVE - Abnormal    D-Dimer Quantitative 0.52 (*)    CBC WITH PLATELETS AND DIFFERENTIAL - Abnormal    WBC Count 11.7 (*)     RBC Count 4.90 "      Hemoglobin 12.5      Hematocrit 39.0      MCV 80      MCH 25.5 (*)     MCHC 32.1      RDW 14.3      Platelet Count 400      % Neutrophils 59      % Lymphocytes 35      % Monocytes 5      % Eosinophils 1      % Basophils 0      % Immature Granulocytes 0      NRBCs per 100 WBC 0      Absolute Neutrophils 6.7      Absolute Lymphocytes 4.1      Absolute Monocytes 0.6      Absolute Eosinophils 0.2      Absolute Basophils 0.0      Absolute Immature Granulocytes 0.0      Absolute NRBCs 0.0     TROPONIN T, HIGH SENSITIVITY - Normal    Troponin T, High Sensitivity <6     HCG QUALITATIVE PREGNANCY - Normal    hCG Serum Qualitative Negative         RADIOLOGY:  CT Chest Pulmonary Embolism w Contrast   Final Result   IMPRESSION:   1.  Normal CT of the chest. No evidence of acute pulmonary embolism.             EKG:   Performed at: 12:22  Impression: Moderate voltage criteria for LVH, may be normal variant. Borderline.  Rate: 67  Rhythm: Normal sinus  QRS Interval: 88  QTc Interval: 439  Comparison: No changes compared to ECG of 6/28/23 13:32  I have independently reviewed and interpreted the EKG(s) documented above.     PROCEDURES:   Procedures       I, Celestinamiguelina Maadn, am serving as a scribe to document services personally performed by Dr. Declan Hill based on my observation and the provider's statements to me. I, Declan Hill M.D. attest that Opal Hager is acting in a scribe capacity, has observed my performance of the services and has documented them in accordance with my direction.      Declan Hill M.D.  Emergency Medicine  [facility] EMERGENCY DEPARTMENT  [facility] [facility] [id number] [phone]  Dept: [phone]     Declan Hill MD  07/04/23 8388

## 2023-07-04 NOTE — ED TRIAGE NOTES
"Patient arrives to triage from home with chief complaint of ongoing chest pressure and shortness of breath since last Thursday.  Patient reports she was seen here last week and diagnosed with upper respiratory infection.  Reports compliance with prescribed prednisone and albuterol but doesn't seem to be helping.  Patient describes pain as \"pressure\" and \"heaviness\" in medial and left chest.  Alert and oriented x4.       "

## 2023-07-09 LAB
ATRIAL RATE - MUSE: 67 BPM
DIASTOLIC BLOOD PRESSURE - MUSE: NORMAL MMHG
INTERPRETATION ECG - MUSE: NORMAL
P AXIS - MUSE: 51 DEGREES
PR INTERVAL - MUSE: 152 MS
QRS DURATION - MUSE: 88 MS
QT - MUSE: 416 MS
QTC - MUSE: 439 MS
R AXIS - MUSE: 5 DEGREES
SYSTOLIC BLOOD PRESSURE - MUSE: NORMAL MMHG
T AXIS - MUSE: 13 DEGREES
VENTRICULAR RATE- MUSE: 67 BPM

## 2023-07-17 ENCOUNTER — OFFICE VISIT (OUTPATIENT)
Dept: CARDIOLOGY | Facility: CLINIC | Age: 33
End: 2023-07-17
Attending: EMERGENCY MEDICINE
Payer: COMMERCIAL

## 2023-07-17 VITALS
WEIGHT: 293 LBS | RESPIRATION RATE: 16 BRPM | BODY MASS INDEX: 44.41 KG/M2 | DIASTOLIC BLOOD PRESSURE: 86 MMHG | HEIGHT: 68 IN | HEART RATE: 79 BPM | SYSTOLIC BLOOD PRESSURE: 120 MMHG

## 2023-07-17 DIAGNOSIS — R07.9 ACUTE CHEST PAIN: ICD-10-CM

## 2023-07-17 PROCEDURE — 99204 OFFICE O/P NEW MOD 45 MIN: CPT | Performed by: INTERNAL MEDICINE

## 2023-07-17 RX ORDER — LURASIDONE HYDROCHLORIDE 20 MG/1
TABLET, FILM COATED ORAL
COMMUNITY
Start: 2023-07-08

## 2023-07-17 NOTE — PATIENT INSTRUCTIONS
You have chest wall pain, also known as costochondritis.  Try taking 600 to 800 mg of ibuprofen 3 times a day with food for 5 to 7 days.  This may help to bring relief from this irritation.  Try to get 20 to 30 minutes of walking in each day, or a total of 150 minutes of aerobic activities each week.  If this  activity worsens your chest discomfort, stop and give us a call.  Quitting smoking now is in your best long-term interest.  Assistance is available if you would like.  Many of your symptoms are suggestive of sleep apnea.  Please call [phone] to schedule Sleep consultation.  Another option is [facility] at [phone].

## 2023-07-17 NOTE — PROGRESS NOTES
CARDIOLOGY RAPID ACCESS CLINIC CONSULT NOTE     Assessment/Plan:   1.  Chest wall pain consistent with costochondritis.  Advised 600 to 800 mg of ibuprofen 3 times daily with meals for 5 to 7 days.  2.  Morbid obesity with physical inactivity.  Recommended 150 minutes of moderate aerobic activities each week.  3.  Tobacco abuse.  Cessation encouraged, assistance offered.  4.  Probable obstructive sleep apnea.  May be contributing significantly to her symptoms of fatigue, and depression.  Strongly encouraged her to follow through with sleep apnea evaluation and treatment.    Follow-up as needed     History of Present Illness:     It is my pleasure to see June Yepez at the [facility] RAPID ACCESS clinic for evaluation of chest pressure.    June Yepez is a 33 year old female with a past medical history of morbid obesity, ongoing tobacco abuse, depression.  She currently smokes about a quarter of a pack of cigarettes daily.  She has no history of hypertension or diabetes mellitus.  Has a long history of poorly restorative sleep with night sweats, daytime sleepiness.  Over the last 3 weeks she has had nearly continuous left-sided chest pressure, not provoked with activities but worsened with arm movements.  No change with food and did not improve with acetaminophen or 400 mg of ibuprofen.  She went to the emergency room for evaluation where troponins were normal.  ECG showed LVH.  She was given IV Ativan with improvement in her symptoms.  Since her emergency room visit the chest pain has persisted, frightening her because of her mom's history of recurrent myocardial infarctions at age 40, 56, and 62.  She is fearful of exercise at this point, becoming tearful today.    Past Medical History:     Patient Active Problem List   Diagnosis     Abnormal Pap smear of cervix     Allergic rhinitis     Posttraumatic stress disorder     Major depressive disorder, single episode, moderate (H)     Unprotected  sexual intercourse     Morbid obesity (H)     Family history of malignant neoplasm of breast in relative diagnosed when younger than 45 years of age       Past Surgical History:     Past Surgical History:   Procedure Laterality Date     CHOLECYSTECTOMY  15yo     CHOLECYSTECTOMY         Family History:     Family History   Problem Relation Age of Onset     Diabetes Mother         type 1 and 2     Hypertension Mother      Breast Cancer Mother 35     Asthma Other      Other - See Comments Other         brochitis     Heart Disease Other      Other - See Comments Other         phlebitis     Cerebrovascular Disease Other      Other - See Comments Other         Colitis     Diabetes Other      Thyroid Disease Other      Arthritis Other      Other - See Comments Other         mental health     Family history reviewed and is not pertinent to the chief complaint or presenting problem    Social History:    reports that she has never smoked. She has never used smokeless tobacco. She reports current alcohol use. She reports that she does not use drugs.    Exercise: Minimal.  Climbs stairs in her home without difficulty    Sleep: Poorly restorative with chronic daytime sleepiness, frequent nocturnal awakenings.  Snoring.  Brother has sleep apnea on CPAP with improved quality of life on treatment    Meds:     Current Outpatient Medications   Medication Sig Dispense Refill     acetaminophen (TYLENOL) 500 MG tablet Take 500-1,000 mg by mouth every 6 hours as needed Reported on 3/28/2017       albuterol (PROAIR HFA/PROVENTIL HFA/VENTOLIN HFA) 108 (90 Base) MCG/ACT inhaler Inhale 2 puffs into the lungs every 4 hours as needed for shortness of breath, wheezing or cough (chest tightness) 18 g 0     etonogestrel (NEXPLANON) 68 MG IMPL 1 each by Subdermal route once Placed 5/2015       hydrOXYzine (ATARAX) 25 MG tablet Take 1 tablet (25 mg) by mouth every 6 hours as needed for anxiety (Can take 50 mg before bedtime to help you sleep.) 90  "tablet 3     ibuprofen (ADVIL/MOTRIN) 400 MG tablet Take 1 tablet (400 mg) by mouth every 6 hours as needed for moderate pain or fever 120 tablet 0     LORazepam (ATIVAN) 0.5 MG tablet Take 1 tablet (0.5 mg) by mouth every 6 hours as needed for anxiety 10 tablet 0     lurasidone (LATUDA) 20 MG TABS tablet TAKE ONE-HALF TABLET BY MOUTH ONCE DAILY WITH FOOD FOR 2 WEEKS THEN INCREASE TO ONE DAILY         Allergies:   Egg yolk, Hydrocodone-acetaminophen, Penicillins, Acetaminophen, and Olive oil    Review of Systems:     Positive for night sweats, cough, shortness of breath, snoring, chest pressure, shortness of breath with activity, diarrhea, nocturia, muscle weakness, muscle pain, daytime sleepiness, loss of balance, depression, anxiety.  12 point review of systems otherwise negative     Please refer above for cardiac ROS details.       Objective:      Physical Exam  (!) 165.6 kg (365 lb)  1.727 m (5' 8\")  Body mass index is 55.5 kg/m .  /86 (BP Location: Right arm, Patient Position: Sitting, Cuff Size: Adult Large)   Pulse 79   Resp 16   Ht 1.727 m (5' 8\")   Wt (!) 165.6 kg (365 lb)   BMI 55.50 kg/m          General Appearance : Awake, Alert, No acute distress.  Tearful.  HEENT: No Scleral icterus; the mucous membranes were pink and moist.  Conjunctivae not injected  Neck:  No cervical bruits, jugular venous distention, or thyromegaly   Chest: The spine was straight. Chest wall symmetric.  Positive left costochondral tenderness reproducing chest pain  Lungs: Respirations unlabored; the lungs are clear to auscultation.  No wheezing   Cardiovascular: Nonpalpable point of maximal impulse.  Auscultation reveals regular first and second heart sounds with no murmurs, rubs, or gallops.  Carotid, radial, and dorsalis pedal pulses are intact and symmetric.    Abdomen: Obese.  No organomegaly, masses, bruits, or tenderness. Bowels sounds are present  Extremities: No edema  Skin: No xanthelasma. Warm, " Dry.  Musculoskeletal: No tenderness.  Neurologic: Alert and oriented ×3. Speech is fluent.      EKG (personally reviewed):  7/4/2023: Sinus rhythm 67 bpm.  LVH by voltage criteria.  No change versus prior    Cardiac Imaging Studies:  CTA chest PE run 7/4/2023: CORONARY ARTERY CALCIFICATION: None.  1.  Normal CT of the chest. No evidence of acute pulmonary embolism.    Lab Review   Lab Results   Component Value Date     07/04/2023    .0 06/23/2015    CO2 27 07/04/2023    CO2 26 04/19/2021    CO2 29.0 06/23/2015    BUN 10.9 07/04/2023    BUN 8 04/19/2021    BUN 8.0 06/23/2015     Lab Results   Component Value Date    WBC 11.7 07/04/2023    HGB 12.5 07/04/2023    HGB 12.8 06/23/2015    HCT 39.0 07/04/2023    HCT 40.2 06/23/2015    MCV 80 07/04/2023    MCV 80.4 06/23/2015     07/04/2023     No results found for: CHOL, TRIG, HDL  No results found for: TROPONINI  No results found for: BNP  No results found for: TSH    Ermias Tomas MD, MD FACC      This note created using Dragon voice recognition software. Sound alike errors may have escaped editing.

## 2023-07-17 NOTE — LETTER
7/17/2023    Physician No Ref-Primary  No address on file    RE: June Yepez       Dear Colleague,     I had the pleasure of seeing June Yepez in the [facility].    CARDIOLOGY RAPID ACCESS CLINIC CONSULT NOTE     Assessment/Plan:   1.  Chest wall pain consistent with costochondritis.  Advised 600 to 800 mg of ibuprofen 3 times daily with meals for 5 to 7 days.  2.  Morbid obesity with physical inactivity.  Recommended 150 minutes of moderate aerobic activities each week.  3.  Tobacco abuse.  Cessation encouraged, assistance offered.  4.  Probable obstructive sleep apnea.  May be contributing significantly to her symptoms of fatigue, and depression.  Strongly encouraged her to follow through with sleep apnea evaluation and treatment.    Follow-up as needed     History of Present Illness:     It is my pleasure to see June Yepez at the [facility] RAPID ACCESS clinic for evaluation of chest pressure.    June Yepez is a 33 year old female with a past medical history of morbid obesity, ongoing tobacco abuse, depression.  She currently smokes about a quarter of a pack of cigarettes daily.  She has no history of hypertension or diabetes mellitus.  Has a long history of poorly restorative sleep with night sweats, daytime sleepiness.  Over the last 3 weeks she has had nearly continuous left-sided chest pressure, not provoked with activities but worsened with arm movements.  No change with food and did not improve with acetaminophen or 400 mg of ibuprofen.  She went to the emergency room for evaluation where troponins were normal.  ECG showed LVH.  She was given IV Ativan with improvement in her symptoms.  Since her emergency room visit the chest pain has persisted, frightening her because of her mom's history of recurrent myocardial infarctions at age 40, 56, and 62.  She is fearful of exercise at this point, becoming tearful today.    Past Medical History:     Patient Active  Problem List   Diagnosis    Abnormal Pap smear of cervix    Allergic rhinitis    Posttraumatic stress disorder    Major depressive disorder, single episode, moderate (H)    Unprotected sexual intercourse    Morbid obesity (H)    Family history of malignant neoplasm of breast in relative diagnosed when younger than 45 years of age       Past Surgical History:     Past Surgical History:   Procedure Laterality Date    CHOLECYSTECTOMY  17yo    CHOLECYSTECTOMY         Family History:     Family History   Problem Relation Age of Onset    Diabetes Mother         type 1 and 2    Hypertension Mother     Breast Cancer Mother 35    Asthma Other     Other - See Comments Other         brochitis    Heart Disease Other     Other - See Comments Other         phlebitis    Cerebrovascular Disease Other     Other - See Comments Other         Colitis    Diabetes Other     Thyroid Disease Other     Arthritis Other     Other - See Comments Other         mental health     Family history reviewed and is not pertinent to the chief complaint or presenting problem    Social History:    reports that she has never smoked. She has never used smokeless tobacco. She reports current alcohol use. She reports that she does not use drugs.    Exercise: Minimal.  Climbs stairs in her home without difficulty    Sleep: Poorly restorative with chronic daytime sleepiness, frequent nocturnal awakenings.  Snoring.  Brother has sleep apnea on CPAP with improved quality of life on treatment    Meds:     Current Outpatient Medications   Medication Sig Dispense Refill    acetaminophen (TYLENOL) 500 MG tablet Take 500-1,000 mg by mouth every 6 hours as needed Reported on 3/28/2017      albuterol (PROAIR HFA/PROVENTIL HFA/VENTOLIN HFA) 108 (90 Base) MCG/ACT inhaler Inhale 2 puffs into the lungs every 4 hours as needed for shortness of breath, wheezing or cough (chest tightness) 18 g 0    etonogestrel (NEXPLANON) 68 MG IMPL 1 each by Subdermal route once Placed  "5/2015      hydrOXYzine (ATARAX) 25 MG tablet Take 1 tablet (25 mg) by mouth every 6 hours as needed for anxiety (Can take 50 mg before bedtime to help you sleep.) 90 tablet 3    ibuprofen (ADVIL/MOTRIN) 400 MG tablet Take 1 tablet (400 mg) by mouth every 6 hours as needed for moderate pain or fever 120 tablet 0    LORazepam (ATIVAN) 0.5 MG tablet Take 1 tablet (0.5 mg) by mouth every 6 hours as needed for anxiety 10 tablet 0    lurasidone (LATUDA) 20 MG TABS tablet TAKE ONE-HALF TABLET BY MOUTH ONCE DAILY WITH FOOD FOR 2 WEEKS THEN INCREASE TO ONE DAILY         Allergies:   Egg yolk, Hydrocodone-acetaminophen, Penicillins, Acetaminophen, and Olive oil    Review of Systems:     Positive for night sweats, cough, shortness of breath, snoring, chest pressure, shortness of breath with activity, diarrhea, nocturia, muscle weakness, muscle pain, daytime sleepiness, loss of balance, depression, anxiety.  12 point review of systems otherwise negative     Please refer above for cardiac ROS details.       Objective:      Physical Exam  (!) 165.6 kg (365 lb)  1.727 m (5' 8\")  Body mass index is 55.5 kg/m .  /86 (BP Location: Right arm, Patient Position: Sitting, Cuff Size: Adult Large)   Pulse 79   Resp 16   Ht 1.727 m (5' 8\")   Wt (!) 165.6 kg (365 lb)   BMI 55.50 kg/m          General Appearance : Awake, Alert, No acute distress.  Tearful.  HEENT: No Scleral icterus; the mucous membranes were pink and moist.  Conjunctivae not injected  Neck:  No cervical bruits, jugular venous distention, or thyromegaly   Chest: The spine was straight. Chest wall symmetric.  Positive left costochondral tenderness reproducing chest pain  Lungs: Respirations unlabored; the lungs are clear to auscultation.  No wheezing   Cardiovascular: Nonpalpable point of maximal impulse.  Auscultation reveals regular first and second heart sounds with no murmurs, rubs, or gallops.  Carotid, radial, and dorsalis pedal pulses are intact and " symmetric.    Abdomen: Obese.  No organomegaly, masses, bruits, or tenderness. Bowels sounds are present  Extremities: No edema  Skin: No xanthelasma. Warm, Dry.  Musculoskeletal: No tenderness.  Neurologic: Alert and oriented ×3. Speech is fluent.      EKG (personally reviewed):  7/4/2023: Sinus rhythm 67 bpm.  LVH by voltage criteria.  No change versus prior    Cardiac Imaging Studies:  CTA chest PE run 7/4/2023: CORONARY ARTERY CALCIFICATION: None.  1.  Normal CT of the chest. No evidence of acute pulmonary embolism.    Lab Review   Lab Results   Component Value Date     07/04/2023    .0 06/23/2015    CO2 27 07/04/2023    CO2 26 04/19/2021    CO2 29.0 06/23/2015    BUN 10.9 07/04/2023    BUN 8 04/19/2021    BUN 8.0 06/23/2015     Lab Results   Component Value Date    WBC 11.7 07/04/2023    HGB 12.5 07/04/2023    HGB 12.8 06/23/2015    HCT 39.0 07/04/2023    HCT 40.2 06/23/2015    MCV 80 07/04/2023    MCV 80.4 06/23/2015     07/04/2023     No results found for: CHOL, TRIG, HDL  No results found for: TROPONINI  No results found for: BNP  No results found for: TSH    Ermias Tomas MD, MD [facility]      This note created using Dragon voice recognition software. Sound alike errors may have escaped editing.       Thank you for allowing me to participate in the care of your patient.      Sincerely,     Ermias Tomas MD     [facility]  cc:   Declan Hill MD  [address] [place] [address]

## 2023-09-26 ENCOUNTER — HOSPITAL ENCOUNTER (EMERGENCY)
Facility: HOSPITAL | Age: 33
Discharge: HOME | End: 2023-09-26
Attending: EMERGENCY MEDICINE | Admitting: EMERGENCY MEDICINE
Payer: COMMERCIAL

## 2023-09-26 ENCOUNTER — APPOINTMENT (OUTPATIENT)
Dept: CT IMAGING | Facility: HOSPITAL | Age: 33
End: 2023-09-26
Attending: EMERGENCY MEDICINE
Payer: COMMERCIAL

## 2023-09-26 VITALS
WEIGHT: 293 LBS | RESPIRATION RATE: 18 BRPM | HEIGHT: 68 IN | DIASTOLIC BLOOD PRESSURE: 95 MMHG | BODY MASS INDEX: 44.41 KG/M2 | OXYGEN SATURATION: 99 % | TEMPERATURE: 99.1 F | HEART RATE: 76 BPM | SYSTOLIC BLOOD PRESSURE: 176 MMHG

## 2023-09-26 DIAGNOSIS — R10.13 EPIGASTRIC PAIN: ICD-10-CM

## 2023-09-26 LAB
ALBUMIN SERPL BCG-MCNC: 3.8 G/DL (ref 3.5–5.2)
ALP SERPL-CCNC: 74 U/L (ref 35–104)
ALT SERPL W P-5'-P-CCNC: 33 U/L (ref 0–50)
ANION GAP SERPL CALCULATED.3IONS-SCNC: 9 MMOL/L (ref 7–15)
AST SERPL W P-5'-P-CCNC: 27 U/L (ref 0–45)
BASOPHILS # BLD AUTO: 0 10E3/UL (ref 0–0.2)
BASOPHILS NFR BLD AUTO: 0 %
BILIRUB DIRECT SERPL-MCNC: <0.2 MG/DL (ref 0–0.3)
BILIRUB SERPL-MCNC: 0.4 MG/DL
BUN SERPL-MCNC: 9.6 MG/DL (ref 6–20)
CALCIUM SERPL-MCNC: 8.8 MG/DL (ref 8.6–10)
CHLORIDE SERPL-SCNC: 104 MMOL/L (ref 98–107)
CREAT SERPL-MCNC: 0.74 MG/DL (ref 0.51–0.95)
DEPRECATED HCO3 PLAS-SCNC: 25 MMOL/L (ref 22–29)
EGFRCR SERPLBLD CKD-EPI 2021: >90 ML/MIN/1.73M2
EOSINOPHIL # BLD AUTO: 0.1 10E3/UL (ref 0–0.7)
EOSINOPHIL NFR BLD AUTO: 3 %
ERYTHROCYTE [DISTWIDTH] IN BLOOD BY AUTOMATED COUNT: 14.3 % (ref 10–15)
GLUCOSE SERPL-MCNC: 149 MG/DL (ref 70–99)
HCG SERPL QL: NEGATIVE
HCT VFR BLD AUTO: 38.3 % (ref 35–47)
HGB BLD-MCNC: 12.3 G/DL (ref 11.7–15.7)
HOLD SPECIMEN: NORMAL
IMM GRANULOCYTES # BLD: 0 10E3/UL
IMM GRANULOCYTES NFR BLD: 0 %
LIPASE SERPL-CCNC: 11 U/L (ref 13–60)
LYMPHOCYTES # BLD AUTO: 1.3 10E3/UL (ref 0.8–5.3)
LYMPHOCYTES NFR BLD AUTO: 29 %
MCH RBC QN AUTO: 25.9 PG (ref 26.5–33)
MCHC RBC AUTO-ENTMCNC: 32.1 G/DL (ref 31.5–36.5)
MCV RBC AUTO: 81 FL (ref 78–100)
MONOCYTES # BLD AUTO: 0.2 10E3/UL (ref 0–1.3)
MONOCYTES NFR BLD AUTO: 4 %
NEUTROPHILS # BLD AUTO: 2.8 10E3/UL (ref 1.6–8.3)
NEUTROPHILS NFR BLD AUTO: 64 %
NRBC # BLD AUTO: 0 10E3/UL
NRBC BLD AUTO-RTO: 0 /100
PLATELET # BLD AUTO: 293 10E3/UL (ref 150–450)
POTASSIUM SERPL-SCNC: 4 MMOL/L (ref 3.4–5.3)
PROT SERPL-MCNC: 7.2 G/DL (ref 6.4–8.3)
RBC # BLD AUTO: 4.74 10E6/UL (ref 3.8–5.2)
SODIUM SERPL-SCNC: 138 MMOL/L (ref 135–145)
WBC # BLD AUTO: 4.5 10E3/UL (ref 4–11)

## 2023-09-26 PROCEDURE — 85025 COMPLETE CBC W/AUTO DIFF WBC: CPT | Performed by: EMERGENCY MEDICINE

## 2023-09-26 PROCEDURE — 36415 COLL VENOUS BLD VENIPUNCTURE: CPT | Performed by: EMERGENCY MEDICINE

## 2023-09-26 PROCEDURE — 74177 CT ABD & PELVIS W/CONTRAST: CPT

## 2023-09-26 PROCEDURE — 83690 ASSAY OF LIPASE: CPT | Performed by: EMERGENCY MEDICINE

## 2023-09-26 PROCEDURE — 96375 TX/PRO/DX INJ NEW DRUG ADDON: CPT

## 2023-09-26 PROCEDURE — 250N000011 HC RX IP 250 OP 636: Mod: JZ | Performed by: EMERGENCY MEDICINE

## 2023-09-26 PROCEDURE — 82248 BILIRUBIN DIRECT: CPT | Performed by: EMERGENCY MEDICINE

## 2023-09-26 PROCEDURE — 96374 THER/PROPH/DIAG INJ IV PUSH: CPT | Mod: 59

## 2023-09-26 PROCEDURE — 80053 COMPREHEN METABOLIC PANEL: CPT | Performed by: EMERGENCY MEDICINE

## 2023-09-26 PROCEDURE — 250N000013 HC RX MED GY IP 250 OP 250 PS 637: Performed by: EMERGENCY MEDICINE

## 2023-09-26 PROCEDURE — 99285 EMERGENCY DEPT VISIT HI MDM: CPT | Mod: 25

## 2023-09-26 PROCEDURE — 84703 CHORIONIC GONADOTROPIN ASSAY: CPT | Performed by: EMERGENCY MEDICINE

## 2023-09-26 RX ORDER — ONDANSETRON 2 MG/ML
4 INJECTION INTRAMUSCULAR; INTRAVENOUS ONCE
Status: COMPLETED | OUTPATIENT
Start: 2023-09-26 | End: 2023-09-26

## 2023-09-26 RX ORDER — ESOMEPRAZOLE MAGNESIUM 40 MG/1
40 CAPSULE, DELAYED RELEASE ORAL
Qty: 21 CAPSULE | Refills: 0 | Status: SHIPPED | OUTPATIENT
Start: 2023-09-26

## 2023-09-26 RX ORDER — IOPAMIDOL 755 MG/ML
90 INJECTION, SOLUTION INTRAVASCULAR ONCE
Status: COMPLETED | OUTPATIENT
Start: 2023-09-26 | End: 2023-09-26

## 2023-09-26 RX ORDER — MAGNESIUM HYDROXIDE/ALUMINUM HYDROXICE/SIMETHICONE 120; 1200; 1200 MG/30ML; MG/30ML; MG/30ML
15 SUSPENSION ORAL ONCE
Status: COMPLETED | OUTPATIENT
Start: 2023-09-26 | End: 2023-09-26

## 2023-09-26 RX ORDER — SUCRALFATE 1 G/1
1 TABLET ORAL 4 TIMES DAILY
Qty: 84 TABLET | Refills: 0 | Status: SHIPPED | OUTPATIENT
Start: 2023-09-26 | End: 2023-10-17

## 2023-09-26 RX ADMIN — FAMOTIDINE 40 MG: 10 INJECTION, SOLUTION INTRAVENOUS at 12:13

## 2023-09-26 RX ADMIN — ALUMINUM HYDROXIDE, MAGNESIUM HYDROXIDE, AND SIMETHICONE 15 ML: 200; 200; 20 SUSPENSION ORAL at 12:17

## 2023-09-26 RX ADMIN — IOPAMIDOL 90 ML: 755 INJECTION, SOLUTION INTRAVENOUS at 13:18

## 2023-09-26 RX ADMIN — ONDANSETRON 4 MG: 2 INJECTION INTRAMUSCULAR; INTRAVENOUS at 12:16

## 2023-09-26 NOTE — DISCHARGE INSTRUCTIONS
Take carafate and nexium as directed and follow up with primary clinic and GI specialist - call for appointments.  I also put in for GI referral.  Return for uncontrolled vomiting/pain, fevers > 100.  Medications may take 2-3 days to see some marked improvement.

## 2023-09-26 NOTE — ED TRIAGE NOTES
Pt reports at 0930 this morning she began cramping in mid upper abdominal pain. Pt was at work sitting down. States it feels like contractions. Reports is not pregnant. Pt has nexplanon and does not get periods. C/o nausea and diarrhea. 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 Pt ate pasta last night.

## 2023-09-26 NOTE — ED PROVIDER NOTES
"  Emergency Department Encounter     Evaluation Date & Time:   No admission date for patient encounter.    CHIEF COMPLAINT:  Abdominal Pain      Triage Note:Pt reports at 0930 this morning she began cramping in mid upper abdominal pain. Pt was at work sitting down. States it feels like contractions. Reports is not pregnant. Pt has nexplanon and does not get periods. C/o nausea and diarrhea. 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 Pt ate pasta last night.                  ED COURSE & MEDICAL DECISION MAKING:     ED Course as of 09/26/23 1444   Tue Sep 26, 2023   1216 CBC reassuring.   1320 Pt re-evaluated, pain somewhat improved.   1320 Labs all reassuring.   1400 CT abd/pelvis neg for pathology.   1417 Pt updated. Abdomen remains benign. Discussed results, Rx for nexium and carafate, outpatient primary and GI follow up.       Pt here with new onset epigastric pain described as intermittent \"contractions\" since around 0930 today. Woke up feeling well until this started. Reports some nausea, but no emesis, fevers, cp/sob.  Pt does report some loose stool, but no bleeding, no recent nsaid or alcohol use.  Pt with benign abdomen and s/p cholecystectomy as a teenager.  Consideration for gastritis or other GI pathology based on benign abdominal exam, but consideration for more nefarious pathology, so getting labs and CT. Pt with intact pulses and do not suspect aortic pathology at this time.      12:03 PM I met with the patient, obtained history, performed an initial exam, and discussed options and plan for diagnostics and treatment here in the ED.   2:08 PM I rechecked and updated the patient     Medical Decision Making    History:  Supplemental history from: Documented in chart, if applicable  External Record(s) reviewed: Documented in chart, if applicable.    Work Up:  Chart documentation includes differential considered and any EKGs or imaging independently interpreted by provider, where " specified.  In additional to work up documented, I considered the following work up: Documented in chart, if applicable.    External consultation:  Discussion of management with another provider: Documented in chart, if applicable    Complicating factors:  Care impacted by chronic illness: Other: obesity  Care affected by social determinants of health: N/A    Disposition considerations: Discharge. I prescribed additional prescription strength medication(s) as charted. I considered admission, but ultimately discharged patient after reassuring workup, outpatient follow up plan, strict return precautions.      At the conclusion of the encounter I discussed the results of all the tests and the disposition. The questions were answered. The patient or family acknowledged understanding and was agreeable with the care plan.      MEDICATIONS GIVEN IN THE EMERGENCY DEPARTMENT:  Medications   famotidine (PEPCID) injection 40 mg (40 mg Intravenous $Given 9/26/23 1213)   alum & mag hydroxide-simethicone (MAALOX) suspension 15 mL (15 mLs Oral $Given 9/26/23 1217)   ondansetron (ZOFRAN) injection 4 mg (4 mg Intravenous $Given 9/26/23 1216)   iopamidol (ISOVUE-370) solution 90 mL (90 mLs Intravenous $Given 9/26/23 1318)       NEW PRESCRIPTIONS STARTED AT TODAY'S ED VISIT:  Discharge Medication List as of 9/26/2023  2:31 PM        START taking these medications    Details   esomeprazole (NEXIUM) 40 MG DR capsule Take 1 capsule (40 mg) by mouth every morning (before breakfast) Take 30-60 minutes before eating., Disp-21 capsule, R-0, E-Prescribe      sucralfate (CARAFATE) 1 GM tablet Take 1 tablet (1 g) by mouth 4 times daily for 21 days, Disp-84 tablet, R-0, E-Prescribe             HPI   The history is provided by the patient. No  was used.        June Yepez is a 33 year old female with a pertinent history of s/p cholecystectomy (remote), morbid obesity, who presents to this ED via walk-in for evaluation of  abdominal pain.    Patient reports upper abdominal pain starting around 0930 this morning that she describes as similar to contractions. She denies chance of pregnancy. The pain comes and goes. She endorses nausea and diarrhea. No vomiting or blood in stool. She denies taking ibuprofen. She notes she has had her gallbladder removed. No chest pain, trouble breathing, or any other current complaints.      REVIEW OF SYSTEMS:  See HPI      Medical History     Past Medical History:   Diagnosis Date    Allergic rhinitis, cause unspecified 03/19/2013    Bipolar disorder (H)     Depression     Depression     H/O abnormal Pap smear     Obesity     Obesity, unspecified 03/19/2013    PID (pelvic inflammatory disease) 07/01/2013    Posttraumatic stress disorder 03/19/2013    Uncomplicated asthma        Past Surgical History:   Procedure Laterality Date    CHOLECYSTECTOMY  17yo    CHOLECYSTECTOMY         Family History   Problem Relation Age of Onset    Diabetes Mother         type 1 and 2    Hypertension Mother     Breast Cancer Mother 35    Asthma Other     Other - See Comments Other         brochitis    Heart Disease Other     Other - See Comments Other         phlebitis    Cerebrovascular Disease Other     Other - See Comments Other         Colitis    Diabetes Other     Thyroid Disease Other     Arthritis Other     Other - See Comments Other         mental health       Social History     Tobacco Use    Smoking status: Never    Smokeless tobacco: Never   Substance Use Topics    Alcohol use: Yes     Comment: Alcoholic Drinks/day: occaisional    Drug use: No       esomeprazole (NEXIUM) 40 MG DR capsule  sucralfate (CARAFATE) 1 GM tablet  acetaminophen (TYLENOL) 500 MG tablet  albuterol (PROAIR HFA/PROVENTIL HFA/VENTOLIN HFA) 108 (90 Base) MCG/ACT inhaler  etonogestrel (NEXPLANON) 68 MG IMPL  hydrOXYzine (ATARAX) 25 MG tablet  ibuprofen (ADVIL/MOTRIN) 400 MG tablet  LORazepam (ATIVAN) 0.5 MG tablet  lurasidone (LATUDA) 20 MG  "TABS tablet        Physical Exam     Vitals:  BP (!) 176/95   Pulse 76   Temp 99.1  F (37.3  C) (Temporal)   Resp 18   Ht 1.727 m (5' 8\")   Wt (!) 157.3 kg (346 lb 11.2 oz)   SpO2 99%   BMI 52.72 kg/m      PHYSICAL EXAM:   Physical Exam  Vitals and nursing note reviewed.   Constitutional:       General: She is not in acute distress.     Appearance: She is obese.      Comments: Uncomfortable appearing.   HENT:      Head: Normocephalic and atraumatic.      Nose: Nose normal.      Mouth/Throat:      Mouth: Mucous membranes are moist.   Eyes:      Pupils: Pupils are equal, round, and reactive to light.   Cardiovascular:      Rate and Rhythm: Normal rate and regular rhythm.      Pulses: Normal pulses.           Radial pulses are 2+ on the right side and 2+ on the left side.        Dorsalis pedis pulses are 2+ on the right side and 2+ on the left side.   Pulmonary:      Effort: Pulmonary effort is normal. No respiratory distress.      Breath sounds: Normal breath sounds.   Abdominal:      Palpations: Abdomen is soft.      Tenderness: There is no abdominal tenderness. Negative signs include McBurney's sign.   Musculoskeletal:      Cervical back: Full passive range of motion without pain and neck supple.      Comments: No calf tenderness or swelling b/l   Skin:     General: Skin is warm.      Findings: No rash.   Neurological:      General: No focal deficit present.      Mental Status: She is alert. Mental status is at baseline.      Comments: Fluent speech, no acute lateralizing deficits   Psychiatric:         Mood and Affect: Mood normal.         Behavior: Behavior normal.           Results     LAB:  All pertinent labs reviewed and interpreted  Labs Ordered and Resulted from Time of ED Arrival to Time of ED Departure   COMPREHENSIVE METABOLIC PANEL - Abnormal       Result Value    Sodium 138      Potassium 4.0      Carbon Dioxide (CO2) 25      Anion Gap 9      Urea Nitrogen 9.6      Creatinine 0.74      GFR " Estimate >90      Calcium 8.8      Chloride 104      Glucose 149 (*)     Alkaline Phosphatase 74      AST 27      ALT 33      Protein Total 7.2      Albumin 3.8      Bilirubin Total 0.4     LIPASE - Abnormal    Lipase 11 (*)    CBC WITH PLATELETS AND DIFFERENTIAL - Abnormal    WBC Count 4.5      RBC Count 4.74      Hemoglobin 12.3      Hematocrit 38.3      MCV 81      MCH 25.9 (*)     MCHC 32.1      RDW 14.3      Platelet Count 293      % Neutrophils 64      % Lymphocytes 29      % Monocytes 4      % Eosinophils 3      % Basophils 0      % Immature Granulocytes 0      NRBCs per 100 WBC 0      Absolute Neutrophils 2.8      Absolute Lymphocytes 1.3      Absolute Monocytes 0.2      Absolute Eosinophils 0.1      Absolute Basophils 0.0      Absolute Immature Granulocytes 0.0      Absolute NRBCs 0.0     HCG QUALITATIVE PREGNANCY - Normal    hCG Serum Qualitative Negative     BILIRUBIN DIRECT - Normal    Bilirubin Direct <0.20         RADIOLOGY:  CT Abdomen Pelvis w Contrast   Final Result   IMPRESSION:    1.  No acute abdominopelvic abnormality.                   ECG:  none    PROCEDURES:  Procedures:  none      FINAL IMPRESSION:    ICD-10-CM    1. Epigastric pain  R10.13 Adult GI  Referral - Consult Only          0 minutes of critical care time      I, Aria Pineda, am serving as a scribe to document services personally performed by Dr. Beny Brady, based on my observations and the provider's statements to me. I, Beny Brady, DO attest that /amh is acting in a scribe capacity, has observed my performance of the services and has documented them in accordance with my direction.      Beny Brady DO  Emergency Medicine  [facility] EMERGENCY DEPARTMENT  9/26/2023  12:03 PM          Beny Brady MD  09/26/23 [id number]

## 2024-06-22 ENCOUNTER — HEALTH MAINTENANCE LETTER (OUTPATIENT)
Age: 34
End: 2024-06-22

## 2025-07-12 ENCOUNTER — HEALTH MAINTENANCE LETTER (OUTPATIENT)
Age: 35
End: 2025-07-12